# Patient Record
Sex: MALE | Race: BLACK OR AFRICAN AMERICAN | Employment: UNEMPLOYED | URBAN - NONMETROPOLITAN AREA
[De-identification: names, ages, dates, MRNs, and addresses within clinical notes are randomized per-mention and may not be internally consistent; named-entity substitution may affect disease eponyms.]

---

## 2017-01-04 ENCOUNTER — OFFICE VISIT (OUTPATIENT)
Dept: FAMILY MEDICINE CLINIC | Age: 47
End: 2017-01-04

## 2017-01-04 VITALS
DIASTOLIC BLOOD PRESSURE: 82 MMHG | HEIGHT: 68 IN | WEIGHT: 169 LBS | TEMPERATURE: 60.8 F | RESPIRATION RATE: 16 BRPM | BODY MASS INDEX: 25.61 KG/M2 | SYSTOLIC BLOOD PRESSURE: 154 MMHG | HEART RATE: 80 BPM

## 2017-01-04 DIAGNOSIS — I10 ESSENTIAL HYPERTENSION: Primary | ICD-10-CM

## 2017-01-04 DIAGNOSIS — Z13.9 SCREENING: ICD-10-CM

## 2017-01-04 DIAGNOSIS — Z23 NEEDS FLU SHOT: ICD-10-CM

## 2017-01-04 DIAGNOSIS — Z72.0 TOBACCO ABUSE: ICD-10-CM

## 2017-01-04 PROCEDURE — 99213 OFFICE O/P EST LOW 20 MIN: CPT | Performed by: NURSE PRACTITIONER

## 2017-01-04 PROCEDURE — 90471 IMMUNIZATION ADMIN: CPT | Performed by: NURSE PRACTITIONER

## 2017-01-04 PROCEDURE — 99406 BEHAV CHNG SMOKING 3-10 MIN: CPT | Performed by: NURSE PRACTITIONER

## 2017-01-04 PROCEDURE — 90688 IIV4 VACCINE SPLT 0.5 ML IM: CPT | Performed by: NURSE PRACTITIONER

## 2017-01-04 RX ORDER — AMLODIPINE BESYLATE 5 MG/1
TABLET ORAL
Qty: 30 TABLET | Refills: 11 | Status: SHIPPED | OUTPATIENT
Start: 2017-01-04 | End: 2021-04-12

## 2017-01-04 RX ORDER — ASPIRIN 81 MG/1
81 TABLET ORAL DAILY
Qty: 30 TABLET | Refills: 3 | Status: SHIPPED | OUTPATIENT
Start: 2017-01-04 | End: 2017-06-14 | Stop reason: SDUPTHER

## 2017-01-04 RX ORDER — VARENICLINE TARTRATE 25 MG
KIT ORAL
Qty: 1 EACH | Refills: 0 | Status: SHIPPED | OUTPATIENT
Start: 2017-01-04 | End: 2017-01-19 | Stop reason: SDUPTHER

## 2017-01-04 RX ORDER — VARENICLINE TARTRATE 1 MG/1
1 TABLET, FILM COATED ORAL 2 TIMES DAILY
Qty: 90 TABLET | Refills: 1 | Status: SHIPPED | OUTPATIENT
Start: 2017-01-04 | End: 2017-01-19 | Stop reason: SDUPTHER

## 2017-01-04 ASSESSMENT — PATIENT HEALTH QUESTIONNAIRE - PHQ9
1. LITTLE INTEREST OR PLEASURE IN DOING THINGS: 0
SUM OF ALL RESPONSES TO PHQ9 QUESTIONS 1 & 2: 0
SUM OF ALL RESPONSES TO PHQ QUESTIONS 1-9: 0
2. FEELING DOWN, DEPRESSED OR HOPELESS: 0

## 2017-01-04 ASSESSMENT — ENCOUNTER SYMPTOMS
BLURRED VISION: 0
CHEST TIGHTNESS: 0
RHINORRHEA: 0
SHORTNESS OF BREATH: 0
CHOKING: 0
COUGH: 0
GASTROINTESTINAL NEGATIVE: 1

## 2017-01-19 DIAGNOSIS — Z72.0 TOBACCO ABUSE: ICD-10-CM

## 2017-01-19 RX ORDER — VARENICLINE TARTRATE 1 MG/1
1 TABLET, FILM COATED ORAL 2 TIMES DAILY
Qty: 90 TABLET | Refills: 2
Start: 2017-01-19 | End: 2021-03-25 | Stop reason: ALTCHOICE

## 2017-01-19 RX ORDER — VARENICLINE TARTRATE 25 MG
KIT ORAL
Qty: 1 EACH | Refills: 0
Start: 2017-01-19 | End: 2021-03-25 | Stop reason: ALTCHOICE

## 2017-03-30 ENCOUNTER — TELEPHONE (OUTPATIENT)
Dept: FAMILY MEDICINE CLINIC | Age: 47
End: 2017-03-30

## 2017-04-28 ENCOUNTER — TELEPHONE (OUTPATIENT)
Dept: FAMILY MEDICINE CLINIC | Age: 47
End: 2017-04-28

## 2017-06-14 DIAGNOSIS — I10 ESSENTIAL HYPERTENSION: ICD-10-CM

## 2017-06-14 RX ORDER — ASPIRIN 81 MG/1
TABLET ORAL
Qty: 30 TABLET | Refills: 3 | Status: SHIPPED | OUTPATIENT
Start: 2017-06-14 | End: 2017-12-21 | Stop reason: SDUPTHER

## 2017-12-21 DIAGNOSIS — I10 ESSENTIAL HYPERTENSION: ICD-10-CM

## 2017-12-21 RX ORDER — ACETAMINOPHEN/DIPHENHYDRAMINE 500MG-25MG
TABLET ORAL
Qty: 30 TABLET | Refills: 3 | Status: SHIPPED | OUTPATIENT
Start: 2017-12-21 | End: 2021-04-12

## 2021-03-11 ENCOUNTER — HOSPITAL ENCOUNTER (EMERGENCY)
Age: 51
Discharge: HOME | End: 2021-03-11
Attending: EMERGENCY MEDICINE
Payer: COMMERCIAL

## 2021-03-11 VITALS
SYSTOLIC BLOOD PRESSURE: 147 MMHG | OXYGEN SATURATION: 98 % | TEMPERATURE: 98.3 F | DIASTOLIC BLOOD PRESSURE: 95 MMHG | HEART RATE: 87 BPM | BODY MASS INDEX: 24.69 KG/M2 | WEIGHT: 160 LBS | RESPIRATION RATE: 18 BRPM

## 2021-03-11 DIAGNOSIS — S61.452A DOG BITE OF LEFT HAND, INITIAL ENCOUNTER: Primary | ICD-10-CM
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
## 2021-03-11 PROCEDURE — 6360000002 HC RX W HCPCS

## 2021-03-11 PROCEDURE — 12001 RPR S/N/AX/GEN/TRNK 2.5CM/<: CPT

## 2021-03-11 PROCEDURE — 90471 IMMUNIZATION ADMIN: CPT

## 2021-03-11 PROCEDURE — 90715 TDAP VACCINE 7 YRS/> IM: CPT

## 2021-03-11 PROCEDURE — 99283 EMERGENCY DEPT VISIT LOW MDM: CPT

## 2021-03-11 RX ORDER — LIDOCAINE HYDROCHLORIDE 20 MG/ML
INJECTION, SOLUTION INFILTRATION; PERINEURAL
Status: DISCONTINUED
Start: 2021-03-11 | End: 2021-03-11 | Stop reason: WASHOUT

## 2021-03-11 RX ORDER — LIDOCAINE HCL/PF 100 MG/5ML
5 SYRINGE (ML) INJECTION ONCE
Status: DISCONTINUED | OUTPATIENT
Start: 2021-03-11 | End: 2021-03-12 | Stop reason: HOSPADM

## 2021-03-11 RX ORDER — AMOXICILLIN AND CLAVULANATE POTASSIUM 875; 125 MG/1; MG/1
1 TABLET, FILM COATED ORAL 2 TIMES DAILY
Qty: 20 TABLET | Refills: 0 | Status: SHIPPED | OUTPATIENT
Start: 2021-03-11 | End: 2021-03-21

## 2021-03-11 RX ORDER — LIDOCAINE HYDROCHLORIDE 20 MG/ML
INJECTION, SOLUTION INFILTRATION; PERINEURAL
Status: DISCONTINUED
Start: 2021-03-11 | End: 2021-03-12 | Stop reason: HOSPADM

## 2021-03-11 RX ADMIN — TETANUS TOXOID, REDUCED DIPHTHERIA TOXOID AND ACELLULAR PERTUSSIS VACCINE, ADSORBED 0.5 ML: 5; 2.5; 8; 8; 2.5 SUSPENSION INTRAMUSCULAR at 22:16

## 2021-03-11 ASSESSMENT — ENCOUNTER SYMPTOMS
SHORTNESS OF BREATH: 0
COUGH: 0
BACK PAIN: 0
VOMITING: 0
NAUSEA: 0

## 2021-03-11 ASSESSMENT — PAIN DESCRIPTION - FREQUENCY: FREQUENCY: CONTINUOUS

## 2021-03-11 ASSESSMENT — PAIN DESCRIPTION - ORIENTATION: ORIENTATION: LEFT

## 2021-03-12 NOTE — ED NOTES
Bed: 034A  Expected date: 3/11/21  Expected time: 7:48 PM  Means of arrival: [facility]  Comments:  Dog bite     Rosalva Lopez, [address] [facility]  03/11/21 1956

## 2021-03-12 NOTE — ED PROVIDER NOTES
current facility-administered medications for this encounter. Current Outpatient Medications:     amoxicillin-clavulanate (AUGMENTIN) 875-125 MG per tablet, Take 1 tablet by mouth 2 times daily for 10 days, Disp: 20 tablet, Rfl: 0    RA ASPIRIN EC 81 MG EC tablet, take 1 tablet by mouth once daily, Disp: 30 tablet, Rfl: 3    varenicline (CHANTIX) 1 MG tablet, Take 1 tablet by mouth 2 times daily, Disp: 90 tablet, Rfl: 2    varenicline (CHANTIX STARTING MONTH PAK) 0.5 MG X 11 & 1 MG X 42 tablet, Take by mouth., Disp: 1 each, Rfl: 0    amLODIPine (NORVASC) 5 MG tablet, take 1 tablet by mouth once daily, Disp: 30 tablet, Rfl: 11      SOCIAL HISTORY     Social History     Social History Narrative    Not on file     Social History     Tobacco Use    Smoking status: Current Every Day Smoker     Packs/day: 0.50     Types: Cigarettes    Smokeless tobacco: Never Used   Substance Use Topics    Alcohol use: No     Alcohol/week: 0.0 standard drinks    Drug use: No         ALLERGIES   No Known Allergies      FAMILY HISTORY     Family History   Problem Relation Age of Onset    Heart Disease Mother     Cancer Mother     Heart Disease Maternal Grandmother     Cancer Maternal Grandmother          PREVIOUS RECORDS   Previous records reviewed: non-contributory to current problem. .        PHYSICAL EXAM     ED Triage Vitals [03/11/21 1957]   BP Temp Temp Source Pulse Resp SpO2 Height Weight   (!) 147/95 98.3 °F (36.8 °C) Oral 87 18 98 % -- 160 lb (72.6 kg)     Initial vital signs and nursing assessment reviewed and normal. Body mass index is 24.69 kg/m². Pulsoximetry is normal per my interpretation. Additional Vital Signs:  Vitals:    03/11/21 1957   BP: (!) 147/95   Pulse: 87   Resp: 18   Temp: 98.3 °F (36.8 °C)   SpO2: 98%       Physical Exam  Constitutional:       Appearance: Normal appearance. He is normal weight. Cardiovascular:      Rate and Rhythm: Normal rate and regular rhythm. Pulses: Normal pulses. injection 0.5 mL (0.5 mLs Intramuscular Given 3/11/21 0939)         ED COURSE      Strict return precautions and follow up instructions were discussed with the patient prior to discharge, with which the patient agrees. MEDICATION CHANGES     Discharge Medication List as of 3/11/2021 10:10 PM      START taking these medications    Details   amoxicillin-clavulanate (AUGMENTIN) 875-125 MG per tablet Take 1 tablet by mouth 2 times daily for 10 days, Disp-20 tablet, R-0Normal               FINAL DISPOSITION     Final diagnoses:   Dog bite of left hand, initial encounter     Condition: condition: good  Dispo: Discharge to home      This transcription was electronically signed. Parts of this transcriptions may have been dictated by use of voice recognition software and electronically transcribed, and parts may have been transcribed with the assistance of an ED scribe. The transcription may contain errors not detected in proofreading. Please refer to my supervising physician's documentation if my documentation differs.     Electronically Signed: José Marmolejo, 03/15/21, 1:02 PM         Clifford Erazo DPM  Resident  03/11/21 [address] [place] [address]  03/15/21 8314

## 2021-03-12 NOTE — ED TRIAGE NOTES
Pt presents to ED via [facility] for c/o dog bite to the L hand. Pt states he was putting his sister's dog in the kennel when it lunged at him and caught his L palm. Pt presents with H hand bandaged. EMS reports minimal bleeding upon their arrival. Pt states he believes shots are UTD with dog. Pt admits to 2 beers today. Significant other at bedside. Will monitor.

## 2021-03-23 ENCOUNTER — TELEPHONE (OUTPATIENT)
Dept: FAMILY MEDICINE CLINIC | Age: 51
End: 2021-03-23

## 2021-03-23 NOTE — TELEPHONE ENCOUNTER
This pt has not been seen in 4 years so will be a new pt. Please sh[place] that on his appt slot.  thanks

## 2021-03-25 ENCOUNTER — OFFICE VISIT (OUTPATIENT)
Dept: FAMILY MEDICINE CLINIC | Age: 51
End: 2021-03-25
Payer: COMMERCIAL

## 2021-03-25 ENCOUNTER — TELEPHONE (OUTPATIENT)
Dept: FAMILY MEDICINE CLINIC | Age: 51
End: 2021-03-25

## 2021-03-25 VITALS
TEMPERATURE: 98.2 F | OXYGEN SATURATION: 99 % | WEIGHT: 157 LBS | DIASTOLIC BLOOD PRESSURE: 82 MMHG | RESPIRATION RATE: 14 BRPM | HEART RATE: 80 BPM | BODY MASS INDEX: 24.64 KG/M2 | SYSTOLIC BLOOD PRESSURE: 124 MMHG | HEIGHT: 67 IN

## 2021-03-25 DIAGNOSIS — Z48.02 VISIT FOR SUTURE REMOVAL: ICD-10-CM

## 2021-03-25 DIAGNOSIS — S65.312D LACERATION OF DEEP PALMAR ARCH OF LEFT HAND, SUBSEQUENT ENCOUNTER: ICD-10-CM

## 2021-03-25 DIAGNOSIS — Z87.891 PERSONAL HISTORY OF TOBACCO USE, PRESENTING HAZARDS TO HEALTH: Primary | ICD-10-CM

## 2021-03-25 DIAGNOSIS — Z00.00 ENCOUNTER FOR MEDICAL EXAMINATION TO ESTABLISH CARE: ICD-10-CM

## 2021-03-25 PROCEDURE — 99407 BEHAV CHNG SMOKING > 10 MIN: CPT | Performed by: NURSE PRACTITIONER

## 2021-03-25 PROCEDURE — 99204 OFFICE O/P NEW MOD 45 MIN: CPT | Performed by: NURSE PRACTITIONER

## 2021-03-25 ASSESSMENT — PATIENT HEALTH QUESTIONNAIRE - PHQ9
SUM OF ALL RESPONSES TO PHQ QUESTIONS 1-9: 0
SUM OF ALL RESPONSES TO PHQ QUESTIONS 1-9: 0
1. LITTLE INTEREST OR PLEASURE IN DOING THINGS: 0
SUM OF ALL RESPONSES TO PHQ9 QUESTIONS 1 & 2: 0
2. FEELING DOWN, DEPRESSED OR HOPELESS: 0

## 2021-03-25 ASSESSMENT — ENCOUNTER SYMPTOMS
COLOR CHANGE: 1
RESPIRATORY NEGATIVE: 1

## 2021-03-25 NOTE — TELEPHONE ENCOUNTER
Pt scheduled for appt today 3/25/21 with  Angie Angel at 11:00 . Provider requested that we call pt to see if this time still works for pt. Pt verified he will be coming to appt today.
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
## 2021-04-12 ENCOUNTER — OFFICE VISIT (OUTPATIENT)
Dept: FAMILY MEDICINE CLINIC | Age: 51
End: 2021-04-12
Payer: COMMERCIAL

## 2021-04-12 VITALS
RESPIRATION RATE: 10 BRPM | HEIGHT: 67 IN | OXYGEN SATURATION: 100 % | TEMPERATURE: 97.9 F | SYSTOLIC BLOOD PRESSURE: 124 MMHG | DIASTOLIC BLOOD PRESSURE: 76 MMHG | BODY MASS INDEX: 25.43 KG/M2 | WEIGHT: 162 LBS | HEART RATE: 74 BPM

## 2021-04-12 DIAGNOSIS — Z11.59 ENCOUNTER FOR HEPATITIS C SCREENING TEST FOR LOW RISK PATIENT: ICD-10-CM

## 2021-04-12 DIAGNOSIS — Z11.4 SCREENING FOR HIV (HUMAN IMMUNODEFICIENCY VIRUS): ICD-10-CM

## 2021-04-12 DIAGNOSIS — Z13.220 SCREENING CHOLESTEROL LEVEL: ICD-10-CM

## 2021-04-12 DIAGNOSIS — L91.0 KELOID: ICD-10-CM

## 2021-04-12 DIAGNOSIS — Z00.00 PHYSICAL EXAM: Primary | ICD-10-CM

## 2021-04-12 DIAGNOSIS — Z12.5 SCREENING FOR PROSTATE CANCER: ICD-10-CM

## 2021-04-12 PROCEDURE — 99396 PREV VISIT EST AGE 40-64: CPT | Performed by: NURSE PRACTITIONER

## 2021-04-12 NOTE — PATIENT INSTRUCTIONS
to contact your doctor if:    · You do not get better as expected. Where can you learn more? Go to [email or web address] and sign in to your [employer] account. Enter E474 in the [facility] box to learn more about \"Keloids: Care Instructions. \"     If you do not have an account, please click on the \"Sign Up Now\" link. Current as of: July 2, 2020               Content Version: 12.8  © 2006-2021 Healthwise, [employer]. Care instructions adapted under license by [facility] ([facility]). If you have questions about a medical condition or this instruction, always ask your healthcare professional. [place] [address] any warranty or liability for your use of this information. Patient Education        Well Visit, Ages 25 to 48: Care Instructions  Overview     Well visits can help you stay healthy. Your doctor has checked your overall health and may have suggested ways to take good care of yourself. Your doctor also may have recommended tests. At home, you can help prevent illness with healthy eating, regular exercise, and other steps. Follow-up care is a key part of your treatment and safety. Be sure to make and go to all appointments, and call your doctor if you are having problems. It's also a good idea to know your test results and keep a list of the medicines you take. How can you care for yourself at home? · Get screening tests that you and your doctor decide on. Screening helps find diseases before any symptoms appear. · Eat healthy foods. Choose fruits, vegetables, whole grains, protein, and low-fat dairy foods. Limit fat, especially saturated fat. Reduce salt in your diet. · Limit alcohol. If you are a man, have no more than 2 drinks a day or 14 drinks a week. If you are a woman, have no more than 1 drink a day or 7 drinks a week. · Get at least 30 minutes of physical activity on most days of the week. Walking is a good choice.  You also may want to do other activities, such as running, swimming, cycling, or playing tennis or team sports. Discuss any changes in your exercise program with your doctor. · Reach and stay at a healthy weight. This will lower your risk for many problems, such as obesity, diabetes, heart disease, and high blood pressure. · Do not smoke or allow others to smoke around you. If you need help quitting, talk to your doctor about stop-smoking programs and medicines. These can increase your chances of quitting for good. · Care for your mental health. It is easy to get weighed down by worry and stress. Learn strategies to manage stress, like deep breathing and mindfulness, and stay connected with your family and community. If you find you often feel sad or hopeless, talk with your doctor. Treatment can help. · Talk to your doctor about whether you have any risk factors for sexually transmitted infections (STIs). You can help prevent STIs if you wait to have sex with a new partner (or partners) until you've each been tested for STIs. It also helps if you use condoms (male or female condoms) and if you limit your sex partners to one person who only has sex with you. Vaccines are available for some STIs, such as HPV. · Use birth control if it's important to you to prevent pregnancy. Talk with your doctor about the choices available and what might be best for you. · If you think you may have a problem with alcohol or drug use, talk to your doctor. This includes prescription medicines (such as amphetamines and opioids) and illegal drugs (such as cocaine and methamphetamine). Your doctor can help you figure out what type of treatment is best for you. · Protect your skin from too much sun. When you're outdoors from 10 a.m. to 4 p.m., stay in the shade or cover up with clothing and a hat with a wide brim. Wear sunglasses that block UV rays. Even when it's cloudy, put broad-spectrum sunscreen (SPF 30 or higher) on any exposed skin.   · See a dentist one or two times a year for checkups and to have your teeth cleaned. · Wear a seat belt in the car. When should you call for help? Watch closely for changes in your health, and be sure to contact your doctor if you have any problems or symptoms that concern you. Where can you learn more? Go to [email or web address] and sign in to your [employer] account. Enter P072 in the [employer] box to learn more about \"Well Visit, Ages 25 to 48: Care Instructions. \"     If you do not have an account, please click on the \"Sign Up Now\" link. Current as of: May 27, 2020               Content Version: 12.8  © 2006-2021 Healthwise, Incorporated. Care instructions adapted under license by [facility] ([facility]). If you have questions about a medical condition or this instruction, always ask your healthcare professional. [address] any warranty or liability for your use of this information.
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
## 2021-05-07 LAB
ABSOLUTE BASO #: 0 X10E9/L (ref 0–0.2)
ABSOLUTE EOS #: 0.1 X10E9/L (ref 0–0.4)
ABSOLUTE LYMPH #: 2.4 X10E9/L (ref 1–3.5)
ABSOLUTE MONO #: 0.6 X10E9/L (ref 0–0.9)
ABSOLUTE NEUT #: 1.8 X10E9/L (ref 1.5–6.6)
ALBUMIN SERPL-MCNC: 4.6 G/DL (ref 3.2–5.3)
ALK PHOSPHATASE: 46 U/L (ref 39–130)
ALT SERPL-CCNC: 32 U/L (ref 0–40)
ANION GAP SERPL CALCULATED.3IONS-SCNC: 7 MMOL/L (ref 5–15)
AST SERPL-CCNC: 38 U/L (ref 0–41)
BASOPHILS RELATIVE PERCENT: 0.7 %
BILIRUB SERPL-MCNC: 0.6 MG/DL (ref 0.3–1.2)
BUN BLDV-MCNC: 14 MG/DL (ref 5–23)
CALCIUM SERPL-MCNC: 9.6 MG/DL (ref 8.5–10.5)
CHLORIDE BLD-SCNC: 105 MMOL/L (ref 98–109)
CHOLESTEROL/HDL RATIO: 2.5 (ref 1–5)
CHOLESTEROL: 243 MG/DL (ref 150–200)
CO2: 30 MMOL/L (ref 22–32)
CREAT SERPL-MCNC: 1.1 MG/DL (ref 0.6–1.3)
EGFR AFRICAN AMERICAN: >60 ML/MIN/1.73SQ.M
EGFR IF NONAFRICAN AMERICAN: >60 ML/MIN/1.73SQ.M
EOSINOPHILS RELATIVE PERCENT: 1.2 %
GLUCOSE: 86 MG/DL (ref 65–99)
HCT VFR BLD CALC: 39.6 % (ref 39–49)
HDLC SERPL-MCNC: 96 MG/DL
HEMOGLOBIN: 13.3 G/DL (ref 13–17)
HEPATITIS C ANTIBODY: NORMAL
HIV 1,2 COMBO ANTIGEN/ANTIBODY: NORMAL
LDL CHOLESTEROL CALCULATED: 138 MG/DL
LDL/HDL RATIO: 1.4
LYMPHOCYTE %: 50 %
MCH RBC QN AUTO: 32.1 PG (ref 27–34)
MCHC RBC AUTO-ENTMCNC: 33.6 G/DL (ref 32–36)
MCV RBC AUTO: 96 FL (ref 80–100)
MONOCYTES # BLD: 12 %
NEUTROPHILS RELATIVE PERCENT: 36.1 %
PDW BLD-RTO: 15.9 % (ref 11.5–15)
PLATELETS: 242 X10E9/L (ref 150–450)
PMV BLD AUTO: 7.2 FL (ref 7–12)
POTASSIUM SERPL-SCNC: 4.1 MMOL/L (ref 3.5–5)
PSA, ULTRASENSITIVE: 3.03 NG/ML (ref 0–4)
RBC: 4.14 X10E12/L (ref 4.1–5.7)
SODIUM BLD-SCNC: 142 MMOL/L (ref 134–146)
TOTAL PROTEIN: 7.1 G/DL (ref 6–8)
TRIGL SERPL-MCNC: 46 MG/DL (ref 27–150)
TSH SERPL DL<=0.05 MIU/L-ACNC: 1.86 UIU/ML (ref 0.49–4.67)
VLDLC SERPL CALC-MCNC: 9 MG/DL (ref 0–30)
WBC: 4.9 X10E9/L (ref 4–11)

## 2021-05-10 ENCOUNTER — TELEPHONE (OUTPATIENT)
Dept: FAMILY MEDICINE CLINIC | Age: 51
End: 2021-05-10

## 2021-05-10 DIAGNOSIS — E78.00 ELEVATED CHOLESTEROL: Primary | ICD-10-CM

## 2021-05-10 NOTE — TELEPHONE ENCOUNTER
----- Message from BUSHRA Lou - CNP sent at 5/10/2021  7:49 AM EDT -----  Let pt know his kidney and liver function is normal, electrolytes are normal,  Wbc and hgb in normal range. Cholesterol a little high at 243 would recommend work on a high fiber low cholesterol diet and we can recheck labs in 3-4 months.  High fiber, cheerios flax seed fish oil etc.

## 2021-10-12 ENCOUNTER — OFFICE VISIT (OUTPATIENT)
Dept: FAMILY MEDICINE CLINIC | Age: 51
End: 2021-10-12
Payer: COMMERCIAL

## 2021-10-12 ENCOUNTER — TELEPHONE (OUTPATIENT)
Dept: FAMILY MEDICINE CLINIC | Age: 51
End: 2021-10-12

## 2021-10-12 VITALS
DIASTOLIC BLOOD PRESSURE: 88 MMHG | HEIGHT: 68 IN | HEART RATE: 91 BPM | RESPIRATION RATE: 14 BRPM | WEIGHT: 160.4 LBS | SYSTOLIC BLOOD PRESSURE: 136 MMHG | BODY MASS INDEX: 24.31 KG/M2 | TEMPERATURE: 97.9 F | OXYGEN SATURATION: 97 %

## 2021-10-12 DIAGNOSIS — M79.609 PARESTHESIA AND PAIN OF RIGHT EXTREMITY: ICD-10-CM

## 2021-10-12 DIAGNOSIS — M54.6 CHRONIC BILATERAL THORACIC BACK PAIN: ICD-10-CM

## 2021-10-12 DIAGNOSIS — M54.2 CERVICALGIA: Primary | ICD-10-CM

## 2021-10-12 DIAGNOSIS — M54.12 CERVICAL RADICULOPATHY: ICD-10-CM

## 2021-10-12 DIAGNOSIS — Z72.0 TOBACCO ABUSE: ICD-10-CM

## 2021-10-12 DIAGNOSIS — Z12.11 SCREEN FOR COLON CANCER: ICD-10-CM

## 2021-10-12 DIAGNOSIS — Z86.73 HISTORY OF CVA IN ADULTHOOD: ICD-10-CM

## 2021-10-12 DIAGNOSIS — R20.2 PARESTHESIA AND PAIN OF RIGHT EXTREMITY: ICD-10-CM

## 2021-10-12 DIAGNOSIS — G89.29 CHRONIC BILATERAL THORACIC BACK PAIN: ICD-10-CM

## 2021-10-12 PROCEDURE — 96372 THER/PROPH/DIAG INJ SC/IM: CPT | Performed by: NURSE PRACTITIONER

## 2021-10-12 PROCEDURE — 99214 OFFICE O/P EST MOD 30 MIN: CPT | Performed by: NURSE PRACTITIONER

## 2021-10-12 RX ORDER — METHYLPREDNISOLONE ACETATE 40 MG/ML
80 INJECTION, SUSPENSION INTRA-ARTICULAR; INTRALESIONAL; INTRAMUSCULAR; SOFT TISSUE ONCE
Status: COMPLETED | OUTPATIENT
Start: 2021-10-12 | End: 2021-10-12

## 2021-10-12 RX ORDER — ASPIRIN 81 MG/1
81 TABLET ORAL DAILY
Qty: 90 TABLET | Refills: 3 | Status: SHIPPED | OUTPATIENT
Start: 2021-10-12 | End: 2022-06-30

## 2021-10-12 RX ORDER — KETOROLAC TROMETHAMINE 30 MG/ML
60 INJECTION, SOLUTION INTRAMUSCULAR; INTRAVENOUS ONCE
Status: COMPLETED | OUTPATIENT
Start: 2021-10-12 | End: 2021-10-12

## 2021-10-12 RX ORDER — AMLODIPINE BESYLATE 2.5 MG/1
2.5 TABLET ORAL DAILY
Qty: 30 TABLET | Refills: 3 | Status: SHIPPED | OUTPATIENT
Start: 2021-10-12 | End: 2022-01-20 | Stop reason: SDUPTHER

## 2021-10-12 RX ORDER — CYCLOBENZAPRINE HCL 5 MG
5 TABLET ORAL 2 TIMES DAILY PRN
Qty: 10 TABLET | Refills: 0 | Status: SHIPPED | OUTPATIENT
Start: 2021-10-12 | End: 2021-10-22

## 2021-10-12 RX ADMIN — KETOROLAC TROMETHAMINE 60 MG: 30 INJECTION, SOLUTION INTRAMUSCULAR; INTRAVENOUS at 11:26

## 2021-10-12 RX ADMIN — METHYLPREDNISOLONE ACETATE 80 MG: 40 INJECTION, SUSPENSION INTRA-ARTICULAR; INTRALESIONAL; INTRAMUSCULAR; SOFT TISSUE at 11:27

## 2021-10-12 NOTE — TELEPHONE ENCOUNTER
I told him to have paperwork sent to us if SSI needs something, you can contact [employer] to see what needs done thanks
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
## 2021-10-12 NOTE — TELEPHONE ENCOUNTER
At check out, patient stated that he spoke with the provider about contacting SSI.     He provided the following information:    Johnathan douglas/SSI  [phone]

## 2021-10-12 NOTE — TELEPHONE ENCOUNTER
Attempted to call the # provided , received msg if you already spoke with us press 1, if not press 2. There is not a provider line.   Pt will need to find out what is required and let us know    Left detailed msg on pt's VM

## 2021-10-27 ENCOUNTER — PROCEDURE VISIT (OUTPATIENT)
Dept: NEUROLOGY | Age: 51
End: 2021-10-27

## 2021-10-27 DIAGNOSIS — M79.601 RIGHT ARM PAIN: ICD-10-CM

## 2021-10-27 DIAGNOSIS — G56.01 RIGHT CARPAL TUNNEL SYNDROME: Primary | ICD-10-CM

## 2021-10-27 DIAGNOSIS — M54.12 CERVICAL RADICULOPATHY: ICD-10-CM

## 2021-10-27 PROCEDURE — 95886 MUSC TEST DONE W/N TEST COMP: CPT | Performed by: PSYCHIATRY & NEUROLOGY

## 2021-10-27 PROCEDURE — 95909 NRV CNDJ TST 5-6 STUDIES: CPT | Performed by: PSYCHIATRY & NEUROLOGY

## 2021-11-10 ENCOUNTER — HOSPITAL ENCOUNTER (OUTPATIENT)
Dept: PHYSICAL THERAPY | Age: 51
Setting detail: THERAPIES SERIES
Discharge: HOME | End: 2021-11-10
Payer: COMMERCIAL

## 2021-11-10 PROCEDURE — 97161 PT EVAL LOW COMPLEX 20 MIN: CPT

## 2021-11-10 PROCEDURE — 97140 MANUAL THERAPY 1/> REGIONS: CPT

## 2021-11-10 NOTE — PROGRESS NOTES
** PLEASE SIGN, DATE AND TIME CERTIFICATION BELOW AND RETURN TO [facility] OUTPATIENT REHABILITATION (FAX #: [phone]). ATTEST/CO-SIGN IF ACCESSING VIA [employer]. THANK YOU.**    I certify that I have examined the patient below and determined that Physical Medicine and Rehabilitation service is necessary and that I approve the established plan of care for up to 90 days or as specifically noted. Attestation, signature or co-signature of physician indicates approval of certification requirements.    ________________________ ____________ __________  Physician Signature   Date   Time  [address]  PHYSICAL THERAPY  [x] EVALUATION  [] DAILY NOTE (LAND) [] DAILY NOTE (AQUATIC ) [] PROGRESS NOTE [] DISCHARGE NOTE    [x] [address] [facility]   [] [place] [address]    [] [address] [facility]   [] Jonah Blackstone    Date: 11/10/2021  Patient Name:  Angelique Potter  : 1970  MRN: [id number]  CSN: [id number]    Referring Practitioner BUSHRA Willingham - *   Diagnosis Cervicalgia [M54.2]  Pain in thoracic spine [M54.6]  Radiculopathy, cervical region [M54.12]  Pain in unspecified limb [M79.609]  Paresthesia of skin [R20.2]    Treatment Diagnosis Neck pain, RUE pain, decreased ROM, decreased strength. Date of Evaluation 11/10/21    Additional Pertinent History HTN, stroke (),       Functional Outcome Measure Used NDI   Functional Outcome Score 18/50 (32%) (11/10/21)       Insurance: Primary: Payor: Lucas Meyers /  /  / ,   Secondary:    Authorization Information:    Visit # 1, 1/10 for progress note   Visits Allowed: Dorothea Harris after 30   Recertification Date:    Physician Follow-Up: 21   Physician Orders:    History of Present Illness: Pt notes that about 10-12 years ago he went on a trip to [place], noting that on his trip he noticed decreased cervical mobility, though he might of just slept on it wrong.  Pt notes since initial onset his pain, mobility have progressed. Pt notes having RUE radic symptoms at time throughout the posterior aspect of the RUE. Pt notes that his symptoms have made it tough for him to complete work duties, has applied for disability. Has been off of work for about two months. Pt notes having EMG study completed on 10/27/21, goes to  To review results next week. Pt notes increased cervical pain with prolonged sitting, lying supine, + sleep disturbances. 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 Pt notes that in 2013 he had a stroke, was hospitalized for about two weeks, notes that getting light headed at time, other than that no lasting deficits. Pt denies having any previous car accidents or injuries. SUBJECTIVE: Pt denies any recent imaging other than EMG study. Social/Functional History and Current Status:  Medications and Allergies have been reviewed and are listed on Medical History Questionnaire. Nicky Ortiz lives alone in [place] with stairs and a handrail to enter. Task Previous Current   ADLs  Independent Modified Independent Difficulty with RUE ROM. IADL's Independent Modified Independent Has been having friends assist    Ambulation Independent Independent (Sharp pain down spine at times)   Transfers Independent Modified Independent Completed with increased time    Recreation [employer] Independent        Driving Active  Active    Work [employer]. Occupation: [employer]  Off of work since 9/28/21, attendance issues secondary to neck/back symptoms.  Currently applying for disability        Objective:    GENERAL   Pain 4/10 midline C2-T8   Palpation    Sensation    Observation    Edema    Accessory Motion          POSTURE     Comments   Forward Head     Rounded Shoulders     Kyphosis     Lordosis     Lateral Shift     Scoliosis         NECK RANGE OF MOTION   Flexion 33   Extension 36   Rotation Right 34 pain midline C6-T2   Rotation Left 30 Sidebending Right 10 Left mid thoracic pulling/pain    Sidebending Left 16 pain midline C6-T2   Retraction    Protraction    Neck Range of Motion is [facility]  []     UPPER EXTREMITY RANGE OF MOTION    Left Right Comments   Shoulder Flexion 108 Mid thoracic pain (heat) 120 Mid thoracic pain     Shoulder Extension      Shoulder Abduction      Shoulder Adduction      Shoulder External Rotation C7 T2 lateral shoulder pain     Shoulder Internal Rotation T4 T12 (right anterior/lateral shoulder pain     Shoulder Range of Motion is [facility]  []      Elbow Flexion      Elbow Extension      Elbow Range of Motion is [facility]  []     UPPER EXTREMITY STRENGTH    Left Right Comments   Shoulder Flexion 4+/5 4-/5    Shoulder Extension      Shoulder Abduction 5-/5 4-/5 posterior shoulder pain     Shoulder Adduction      Shoulder External Rotation 5/5 5/5    Shoulder Internal Rotation 5/5 4+/5    []  Shoulder Strength is grossly WFL. Elbow Flexion 5/5 5-/5    Elbow Extension 5/5 5/5    Forearm Pronation      Forearm Supination      [] Elbow Strength is grossly WFL.  Strength          SPECIAL TESTS (+/-)    Left Right Comments   Cerv. Compression      Cerv. Distraction      Cerv.  Alar/Transverse      Vertebral Artery - -    Spurling's - -          Nkechi          TREATMENT   Precautions:    Pain: 4/10 midline C2-T8    X in shaded column indicates activity completed today   Modalities Parameters/  Location  Notes   Modalities prn for pain       Estim/US combo             Manual Therapy Time/Technique  Notes   Cervical Rotation/SB PROM   x Limited by gurading   Cervical gentle Side glides / Upglides   x    STM Cervical/Thoracic paraspinals       Exercise/Intervention   Notes   Chin Tuck  8  x    Supine Cervical Rotation        UT/LS/Scalene  Stretch        Supine Pec Major/Minor Stretch        Yajaira pose       S/l thoracic rotation        Cat Camel                             UBE Retro          Interventions Next Treatment: Cervical/Thoracic mobility, restoring UE ROM, postural/UE strengthening per irritability levels. Activity/Treatment Tolerance:  []  Patient tolerated treatment well  []  Patient limited by fatigue  [x]  Patient limited by pain   []  Patient limited by medical complications  []  Other:     Assessment: Pt presents with progressive chronic cervical pain, decreased cervical/UE ROM, and decreased UE strength. Pt currently reports limitations with IADLs, work duties, IADLs, and ADLs. Pt to benefit from skilled PT to address the above deficits. Pt demo fair treatment tolerance this date secondary to pain and guarding. Pt notes feeling \"a little looser\" at the completion of the PT session. Body Structures/Functions/Activity Limitations: impaired activity tolerance, impaired ROM, impaired strength and pain  Prognosis: fair    GOALS:  Patient Goal: Decreasing pain and improving mobility     Short Term Goals: 4 Weeks    Pt will report decreased pain levels from 4/10 to 2/10 for improved comfort and activity tolerance. Long Term Goals: 8 Weeks   Pt will demo cervical spine AROM WFL pain free to aid in ease with Work duties . Pt will improve NDI score from 18/50 to <=8/50 to indicate decreased functional limitations. Pt to improve BUE strength to >= 5-/5 for ease with work duties . Pt to demo good body mechanics within therapy visits. Pt to be compliant and independent with HEP. Patient Education:   [x]  HEP/Education Completed: Plan of Care, Goals, body mechanics, activity modifications, posture.  [email or web address] Access Code: Issue next visit   []  No new Education completed  []  Reviewed Prior HEP      [x]  Patient verbalized and/or demonstrated understanding of education provided. []  Patient unable to verbalize and/or demonstrate understanding of education provided. Will continue education.   []  Barriers to learning:     PLAN:  Treatment Recommendations: Strengthening, Range of Motion, Endurance Training, Neuromuscular Re-education, Manual Therapy - Soft Tissue Mobilization, Pain Management, Patient Education and Modalities    [x]  Plan of care initiated. Plan to see patient 3 times per week for 8 weeks to address the treatment planned outlined above.   []  Continue with current plan of care  []  Modify plan of care as follows:    []  Hold pending physician visit  []  Discharge    Time In 1301   Time Out 1353   Timed Code Minutes: 10 min   Total Treatment Time: 52 min       Electronically Signed by: Erich Munson PT

## 2021-11-11 ENCOUNTER — APPOINTMENT (OUTPATIENT)
Dept: PHYSICAL THERAPY | Age: 51
End: 2021-11-11
Payer: COMMERCIAL

## 2021-11-17 ENCOUNTER — HOSPITAL ENCOUNTER (OUTPATIENT)
Dept: PHYSICAL THERAPY | Age: 51
Setting detail: THERAPIES SERIES
End: 2021-11-17
Payer: COMMERCIAL

## 2021-11-19 ENCOUNTER — APPOINTMENT (OUTPATIENT)
Dept: PHYSICAL THERAPY | Age: 51
End: 2021-11-19
Payer: COMMERCIAL

## 2021-11-22 ENCOUNTER — APPOINTMENT (OUTPATIENT)
Dept: PHYSICAL THERAPY | Age: 51
End: 2021-11-22
Payer: COMMERCIAL

## 2021-11-24 ENCOUNTER — APPOINTMENT (OUTPATIENT)
Dept: PHYSICAL THERAPY | Age: 51
End: 2021-11-24
Payer: COMMERCIAL

## 2021-11-29 ENCOUNTER — APPOINTMENT (OUTPATIENT)
Dept: PHYSICAL THERAPY | Age: 51
End: 2021-11-29
Payer: COMMERCIAL

## 2021-12-03 ENCOUNTER — HOSPITAL ENCOUNTER (OUTPATIENT)
Dept: PHYSICAL THERAPY | Age: 51
Setting detail: THERAPIES SERIES
Discharge: HOME | End: 2021-12-03
Payer: COMMERCIAL

## 2021-12-29 NOTE — DISCHARGE SUMMARY
[facility]    Patient Name: Ewa Rees        CSN: [id number]   YOB: 1970  Gender: male  BUSHRA Messina - *,    Cervicalgia [M54.2]  Pain in thoracic spine [M54.6]  Radiculopathy, cervical region [M54.12]  Pain in unspecified limb [M79.609]  Paresthesia of skin [R20.2] ,      Patient is discharged from Physical Therapy services at this time. See last note for details related to results of therapy and goal achievement. Reason for discharge: Pt was incarcerated shortly after evaluation, re-scheduled after Pt was out of incarceration, Pt no-show/ no-call to scheudled appointment and did re-schedule after attempts to contact patient.  YADI FischerT

## 2022-01-20 ENCOUNTER — OFFICE VISIT (OUTPATIENT)
Dept: FAMILY MEDICINE CLINIC | Age: 52
End: 2022-01-20
Payer: COMMERCIAL

## 2022-01-20 VITALS
HEART RATE: 102 BPM | HEIGHT: 68 IN | RESPIRATION RATE: 14 BRPM | SYSTOLIC BLOOD PRESSURE: 158 MMHG | TEMPERATURE: 98.1 F | OXYGEN SATURATION: 99 % | BODY MASS INDEX: 25.7 KG/M2 | WEIGHT: 169.6 LBS | DIASTOLIC BLOOD PRESSURE: 90 MMHG

## 2022-01-20 DIAGNOSIS — M54.2 CERVICALGIA: ICD-10-CM

## 2022-01-20 DIAGNOSIS — I10 ESSENTIAL HYPERTENSION: Primary | ICD-10-CM

## 2022-01-20 DIAGNOSIS — M54.12 CERVICAL RADICULOPATHY: ICD-10-CM

## 2022-01-20 DIAGNOSIS — Z72.0 TOBACCO ABUSE: ICD-10-CM

## 2022-01-20 DIAGNOSIS — G89.29 CHRONIC BILATERAL THORACIC BACK PAIN: ICD-10-CM

## 2022-01-20 DIAGNOSIS — M54.6 CHRONIC BILATERAL THORACIC BACK PAIN: ICD-10-CM

## 2022-01-20 DIAGNOSIS — G56.01 RIGHT CARPAL TUNNEL SYNDROME: ICD-10-CM

## 2022-01-20 DIAGNOSIS — Z12.11 SCREEN FOR COLON CANCER: ICD-10-CM

## 2022-01-20 PROCEDURE — 4004F PT TOBACCO SCREEN RCVD TLK: CPT | Performed by: NURSE PRACTITIONER

## 2022-01-20 PROCEDURE — G8419 CALC BMI OUT NRM PARAM NOF/U: HCPCS | Performed by: NURSE PRACTITIONER

## 2022-01-20 PROCEDURE — 3017F COLORECTAL CA SCREEN DOC REV: CPT | Performed by: NURSE PRACTITIONER

## 2022-01-20 PROCEDURE — 99214 OFFICE O/P EST MOD 30 MIN: CPT | Performed by: NURSE PRACTITIONER

## 2022-01-20 PROCEDURE — G8484 FLU IMMUNIZE NO ADMIN: HCPCS | Performed by: NURSE PRACTITIONER

## 2022-01-20 PROCEDURE — G8427 DOCREV CUR MEDS BY ELIG CLIN: HCPCS | Performed by: NURSE PRACTITIONER

## 2022-01-20 RX ORDER — AMLODIPINE BESYLATE 5 MG/1
5 TABLET ORAL DAILY
Qty: 90 TABLET | Refills: 1 | Status: SHIPPED | OUTPATIENT
Start: 2022-01-20 | End: 2022-10-10

## 2022-01-20 RX ORDER — CYCLOBENZAPRINE HCL 5 MG
5 TABLET ORAL 2 TIMES DAILY PRN
Qty: 60 TABLET | Refills: 0 | Status: SHIPPED | OUTPATIENT
Start: 2022-01-20 | End: 2022-01-30

## 2022-01-20 RX ORDER — NAPROXEN 375 MG/1
375 TABLET ORAL 2 TIMES DAILY WITH MEALS
Qty: 180 TABLET | Refills: 1 | Status: SHIPPED | OUTPATIENT
Start: 2022-01-20

## 2022-01-20 ASSESSMENT — ENCOUNTER SYMPTOMS
GASTROINTESTINAL NEGATIVE: 1
BACK PAIN: 1
RESPIRATORY NEGATIVE: 1
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
## 2022-02-01 ENCOUNTER — HOSPITAL ENCOUNTER (OUTPATIENT)
Dept: PHYSICAL THERAPY | Age: 52
Setting detail: THERAPIES SERIES
Discharge: HOME | End: 2022-02-01
Payer: COMMERCIAL

## 2022-02-01 PROCEDURE — 97162 PT EVAL MOD COMPLEX 30 MIN: CPT

## 2022-02-01 NOTE — PROGRESS NOTES
** PLEASE SIGN, DATE AND TIME CERTIFICATION BELOW AND RETURN TO [facility] OUTPATIENT REHABILITATION (FAX #: [phone]). ATTEST/CO-SIGN IF ACCESSING VIA [employer]. THANK YOU.**    I certify that I have examined the patient below and determined that Physical Medicine and Rehabilitation service is necessary and that I approve the established plan of care for up to 90 days or as specifically noted. Attestation, signature or co-signature of physician indicates approval of certification requirements.    ________________________ ____________ __________  Physician Signature   Date   Time  [facility]  PHYSICAL THERAPY  [x] EVALUATION  [] DAILY NOTE (LAND) [] DAILY NOTE (AQUATIC ) [] PROGRESS NOTE [] DISCHARGE NOTE    [x] [facility]   [] [address]    [] [address] [facility]   [] Lurene Amel    Date: 2022  Patient Name:  Jody Crews  : 1970  MRN: [id number]  CSN: [id number]    Referring Practitioner BUSHRA Skelton - *   Diagnosis Cervicalgia [M54.2]  Pain in thoracic spine [M54.6]  Radiculopathy, cervical region [M54.12]  Pain in unspecified limb [M79.609]  Paresthesia of skin [R20.2]    Treatment Diagnosis Cervicalgia with right radiculopathy, Cephalgia, Thoracic pain, Upper body weakness   Date of Evaluation 22    Additional Pertinent History High BP, Stroke      Functional Outcome Measure Used NDI   Functional Outcome Score 19 (22)       Insurance: Primary: Payor: Letitia Shows /  /  / ,   Secondary:    Authorization Information: Eval only then auth needed. Max 20 visits per year. Visit # 1, 1/10 for progress note   Visits Allowed: 1   Recertification Date: 13   Physician Follow-Up: 22   Physician Orders:    History of Present Illness:      SUBJECTIVE: States saw doctor about 1-2 weeks ago and was told has a pinched nerve in his neck.  States had an ENG done showing that and he needs to go have more testing done.  has been having problems with his neck and back since 2010 and has been getting worse over time.  gets sporadic symptoms down his right arm. Reports did fall in 2010 and so not sure if injured self then. Reports no deficits from stroke had recently. States sometimes sees little while spots in his vision and last night he got real hot and dizzy and just had to lay down. Reports felt fine after sleeping. States that has happened 3x in last month. Social/Functional History and Current Status:  Medications and Allergies have been reviewed and are listed on Medical History Questionnaire. [address] [facility] Beth lives alone in a multiple floor home with stairs and a handrail to enter. Task Previous Current   ADLs  Independent Modified Independent   IADL's Independent Modified Independent   Ambulation Independent Independent - has problems with his knees   Transfers Independent Independent   Recreation Independent Modified Independent   Community Integration Independent Modified Independent   Driving Active  Active    Work Unemployed  Unemployed     OBJECTIVE:    Pain: 7/10 neck and back   Palpation Mild to moderate tenderness and tension noted bilat sides of neck. Severe tenderness at inferior angle left scapula and mild tenderness same place in right scapula. States more painful tpo palpation closer to spine   Observation Forward head and rounded raised shoulders   Posture Fair       Range of Motion Cervical flexion limited 50% with pain, Extension limited 50% with pain, Rotation left limited 50% with pain and rotation right limited 75% with more significant pain. WFL bilat UE but rep has a knot on right side shoulder that if holds arm up too long it will go numb   Strength Moderate cervical and scapular stabilizer weakness, Right shoulder and bicep/tricep 4-/5 and other motions 4/5. Left arm 4-5/5 throughout. Good  strength. Is right hand dominant. Coordination    Sensation States pain down right arm. Reports tingling down back of right leg. Bed Mobility    Transfers    Ambulation States bilat knees will buckle on him from issues with [employer] when was young. Stairs    Balance    Special Tests          TREATMENT   Precautions: Avoid strenuous activity   Pain: 7/10 neck and back    X in shaded column indicates activity completed today   Modalities Parameters/  Location  Notes                     Manual Therapy Time/Technique  Notes   Tried manual traction, occipital release and light myofascial work and patient could not tolerate any of it  X                Exercise/Intervention   Notes   Tried loaded cervical retractions light pressure x10 - made pain down left side mid back worse and did not change neck pain   X    Educated on posterior capsule stretch and using doorway or tennis ball to work on tightness along medial scapula bilat. Also educated on not sticking head out when watching TV   X                                                                     Specific Interventions Next Treatment: modalities with US and E-stim or combo, manual therapy once can tolerate, cervical retractions if can tolerate, progression to stretching and strengthening as can tolerate    Activity/Treatment Tolerance:  [x]  Patient tolerated treatment well  []  Patient limited by fatigue  [x]  Patient limited by pain   []  Patient limited by medical complications  []  Other:     Assessment: Patient with history of neck and back problems that has been gradually worsening. Patient with random symptoms happening all throughout his body from his head down to his legs. Patient also having vision issues and headaches. Patient not able to tolerate anything started in therapy today so will have to try some hands off treatments with modalities and see if can get pain under control and then try more manual treatments along with strengthening.    Body Structures/Functions/Activity Limitations: impaired activity tolerance, impaired endurance, impaired ROM, impaired sensation, impaired strength, pain and abnormal posture  Prognosis: fair    GOALS:  Patient Goal: To stop the pain in his neck and back. Short Term Goals:  Time Frame: 4 weeks  1) Patient to report 25-50% decrease in neck and back pain for ease with care of home  2) Patient to demonstrate 25-50% increase in cervical range without pain increase for ease when looking when driving  3) Patient to report able to move arms through full range and hold above head without numbness for self care and cleaning home  4) Patient to report no return of headaches for ability to read with increased ease  5) Patient to report able to consistently decrease symptoms down arms to carry things through the house      Long Term Goals:  Time Frame: 12 weeks  1) Patient to be independent with home program to perform all daily activity with minimal to no pain or difficulty       Patient Education:   [x]  HEP/Education Completed: Plan of Care, Goals, stretch above   [address] Access Code:  []  No new Education completed  []  Reviewed Prior HEP      [x]  Patient verbalized and/or demonstrated understanding of education provided. []  Patient unable to verbalize and/or demonstrate understanding of education provided. Will continue education. []  Barriers to learning: None    PLAN:  Treatment Recommendations: Strengthening, Range of Motion, Functional Mobility Training, Manual Therapy - Soft Tissue Mobilization, Pain Management, Home Exercise Program, Patient Education, Integrative Dry Needling and Modalities    [x]  Plan of care initiated. Plan to see patient 2 times per week for 12 weeks to address the treatment planned outlined above.   []  Continue with current plan of care  []  Modify plan of care as follows:    []  Hold pending physician visit  []  Discharge    Time In 1300   Time Out 1400   Timed Code Minutes: 0 min

## 2022-02-14 ENCOUNTER — HOSPITAL ENCOUNTER (OUTPATIENT)
Age: 52
Discharge: HOME | End: 2022-02-14
Payer: COMMERCIAL

## 2022-02-14 ENCOUNTER — HOSPITAL ENCOUNTER (OUTPATIENT)
Dept: GENERAL RADIOLOGY | Age: 52
Discharge: HOME | End: 2022-02-14
Payer: COMMERCIAL

## 2022-02-14 DIAGNOSIS — Z02.71 ENCOUNTER FOR DISABILITY DETERMINATION: ICD-10-CM

## 2022-02-14 PROCEDURE — 72100 X-RAY EXAM L-S SPINE 2/3 VWS: CPT

## 2022-02-14 PROCEDURE — 73560 X-RAY EXAM OF KNEE 1 OR 2: CPT

## 2022-02-15 ENCOUNTER — TELEPHONE (OUTPATIENT)
Dept: FAMILY MEDICINE CLINIC | Age: 52
End: 2022-02-15

## 2022-02-15 NOTE — TELEPHONE ENCOUNTER
Patient calling stating he had a physical for disability 02.14.2022 and BP was elevated   It was suggested that the patient reach out to his provider to have BP medication adjusted    I suggested that the patient contact the office where his physical was completed to send documentation from the visit to his provider for review    Patient has been informed and voiced understanding

## 2022-02-17 ENCOUNTER — HOSPITAL ENCOUNTER (OUTPATIENT)
Dept: PHYSICAL THERAPY | Age: 52
Setting detail: THERAPIES SERIES
Discharge: HOME | End: 2022-02-17
Payer: COMMERCIAL

## 2022-02-17 PROCEDURE — 97110 THERAPEUTIC EXERCISES: CPT

## 2022-02-17 PROCEDURE — 97140 MANUAL THERAPY 1/> REGIONS: CPT

## 2022-02-17 NOTE — PROGRESS NOTES
[facility]  PHYSICAL THERAPY  [] EVALUATION  [x] DAILY NOTE (LAND) [] DAILY NOTE (AQUATIC ) [] PROGRESS NOTE [] DISCHARGE NOTE    [x] OUTPATIENT REHABILITATION CENTER [facility]   [] [place] [address]    [] [facility]   [] Shirin Hindsy     Date: 2022  Patient Name:  Gladys Tello   : 1970  MRN: [id number]  CSN: [id number]    Referring Practitioner BUSHRA To - *   Diagnosis Cervicalgia [M54.2]  Pain in thoracic spine [M54.6]  Radiculopathy, cervical region [M54.12]  Pain in unspecified limb [M79.609]  Paresthesia of skin [R20.2]    Treatment Diagnosis Cervicalgia with right radiculopathy, Cephalgia, Thoracic pain, Upper body weakness   Date of Evaluation 22    Additional Pertinent History High BP, Stroke      Functional Outcome Measure Used NDI   Functional Outcome Score 19 (22)       Insurance: Primary: Payor:  [employer] /  /  / ,   Secondary:    Authorization Information: Eval only then auth needed. Max 20 visits per year. RECEIVED NOTIFICATION FROM ROSA ROJAS  PRIOR AUTHORIZATION REQUIRED AFTER INITIAL EVAL AND   12 VISITS   Visit # 2, 2/10 for progress note   Visits Allowed: Eval + 12 visits    Recertification Date:    Physician Follow-Up: 22   Physician Orders:    History of Present Illness:      SUBJECTIVE: Patient reports that on Monday he had x-rays done of his back, neck and knees. He denies pain currently but reports stiffness. He states that he is having stiffness at the right side of his neck and his left shoulder blade region.        Objective:   TREATMENT   Precautions: Avoid strenuous activity   Pain: Denies pain, \"stiffness\" right cervical region and medial left scapular region    X in shaded column indicates activity completed today   Modalities Parameters/  Location  Notes   MHP to upper thoracic region at the conclusion of session  5 min  X Noted relief from heat               Manual Therapy Time/Technique  Notes   Tried manual traction, occipital release and light myofascial work and patient could not tolerate any of it      Gentle STM to medial border of his left scapular, right scapular and right upper trap 12-15 min  X Tightness noted mostly at medial border of left scapular region. Noted tightness decreased after STM. Exercise/Intervention   Notes   Tried loaded cervical retractions light pressure x10 - made pain down left side mid back worse and did not change neck pain       Educated on posterior capsule stretch and using doorway or tennis ball to work on tightness along medial scapula bilat. Also educated on not sticking head out when watching TV              Upper trap stretch  15 sec  3x X Patient noted pain at center of back between his shoulder blades after performing, noted more discomfort when going to his right compared to left   Levator stretch  15 sec  3x X    Posterior capsule stretch  10 sec  3x L  X Performed at [facility] only today as he feels his right side wants to \"lock up\" when performing   Posterior shoulder rolls   2x5 X    Gentle scapular retraction   Held  X Increased shoulder blade pain so held   Gentle cervical retraction   2x5 X Noted some discomfort between shoulder blades but noted tolerable. Seated upper thoracic stretch over ball  5-10 sec  3x X Noted pain when coming back to resting position                   Specific Interventions Next Treatment: modalities with US and E-stim or combo, manual therapy once can tolerate, cervical retractions if can tolerate, progression to stretching and strengthening as can tolerate    Activity/Treatment Tolerance:  [x]  Patient tolerated treatment well  []  Patient limited by fatigue  [x]  Patient limited by pain   []  Patient limited by medical complications  []  Other:     Assessment: Added therapeutic exercises as documented above.  He was provided with demonstration and cues on proper technique with added exercises to ensure maximal muscle activation. He noted pain at times while performing his exercises as documented above. Patient's main complaint of pain and tightness was at the medial border of his left scapular region. Added manual therapy as documented above. Tightness noted during manual therapy. Ended session with MHP to patient's upper thoracic region. He reported that his tightness improved some at the conclusion of session and he denied any pain at the end of session. Body Structures/Functions/Activity Limitations: impaired activity tolerance, impaired endurance, impaired ROM, impaired sensation, impaired strength, pain and abnormal posture  Prognosis: fair    GOALS:  Patient Goal: To stop the pain in his neck and back. Short Term Goals:  Time Frame: 4 weeks  1) Patient to report 25-50% decrease in neck and back pain for ease with care of home  2) Patient to demonstrate 25-50% increase in cervical range without pain increase for ease when looking when driving  3) Patient to report able to move arms through full range and hold above head without numbness for self care and cleaning home  4) Patient to report no return of headaches for ability to read with increased ease  5) Patient to report able to consistently decrease symptoms down arms to carry things through the house      Long Term Goals:  Time Frame: 12 weeks  1) Patient to be independent with home program to perform all daily activity with minimal to no pain or difficulty       Patient Education:   [x]  HEP/Education Completed: Added therapeutic exercises, manual therapy and use of heat. Discussed with patient in monitoring his response to treatment session.  Monitor Access Code:  []  No new Education completed  []  Reviewed Prior HEP      [x]  Patient verbalized and/or demonstrated understanding of education provided. []  Patient unable to verbalize and/or demonstrate understanding of education provided. Will continue education.   []  Barriers to learning: None    PLAN:  Treatment Recommendations: Strengthening, Range of Motion, Functional Mobility Training, Manual Therapy - Soft Tissue Mobilization, Pain Management, Home Exercise Program, Patient Education, Integrative Dry Needling and Modalities    []  Plan of care initiated. Plan to see patient 2 times per week for 12 weeks to address the treatment planned outlined above.   [x]  Continue with current plan of care  []  Modify plan of care as follows:    []  Hold pending physician visit  []  Discharge    Time In 1348   Time Out 1434   Timed Code Minutes: 41 min   Total Treatment Time: 46 min       Electronically Signed by: Nena Velazco PTA

## 2022-02-21 ENCOUNTER — HOSPITAL ENCOUNTER (OUTPATIENT)
Dept: PHYSICAL THERAPY | Age: 52
Setting detail: THERAPIES SERIES
Discharge: HOME | End: 2022-02-21
Payer: COMMERCIAL

## 2022-02-21 PROCEDURE — 97110 THERAPEUTIC EXERCISES: CPT

## 2022-02-21 PROCEDURE — 97140 MANUAL THERAPY 1/> REGIONS: CPT

## 2022-02-21 NOTE — PROGRESS NOTES
[address] [facility]  PHYSICAL THERAPY  [] EVALUATION  [x] DAILY NOTE (LAND) [] DAILY NOTE (AQUATIC ) [] PROGRESS NOTE [] DISCHARGE NOTE    [x] OUTPATIENT REHABILITATION CENTER [facility]   [] [place] [address]    [] [facility]   [] Calvin Jones      Date: 2022  Patient Name:  Linda Mena.   : 1970   MRN: [id number]  CSN: [id number]    Referring Practitioner BUSHRA Leyva - *   Diagnosis Cervicalgia [M54.2]  Pain in thoracic spine [M54.6]  Radiculopathy, cervical region [M54.12]  Pain in unspecified limb [M79.609]  Paresthesia of skin [R20.2]    Treatment Diagnosis Cervicalgia with right radiculopathy, Cephalgia, Thoracic pain, Upper body weakness   Date of Evaluation 22    Additional Pertinent History High BP, Stroke      Functional Outcome Measure Used NDI   Functional Outcome Score 19 (22)       Insurance: Primary: Payor: Bobby Bruno /  /  / ,   Secondary:    Authorization Information: Eval only then auth needed. Max 20 visits per year. RECEIVED NOTIFICATION FROM ROSA ROJAS  PRIOR AUTHORIZATION REQUIRED AFTER INITIAL EVAL AND   12 VISITS   Visit # 3, 3/10 for progress note   Visits Allowed: Eval + 12 visits    Recertification Date: 16   Physician Follow-Up: 22   Physician Orders:    History of Present Illness:      SUBJECTIVE: Patient reports that after his last session he went home and took a muscle relaxer. He states that he fell asleep after taking the muscle relaxer. He reports compliance with HEP. He states that when he turns his head he still hears what sounds like \"rocks in a bag\". Patient denies pain currently but reports that he continues to have stiffness. He states that last night the way he was sleeping that he had pain at his upper back. He states that he changed the placement of his pillows with made his pain subside. He reports that he continues to randomly get a feeling of being lightheaded and dizzy. He states that this past weekend he got the feeling od being lightheaded but reports that after her laid down that he felt better. He states that his doctor is working on getting him on the right prescription of blood pressure medication. Objective:   TREATMENT   Precautions: Avoid strenuous activity   Pain: Denies pain, \"stiffness\" right cervical region and medial left scapular region    X in shaded column indicates activity completed today   Modalities Parameters/  Location  Notes   MHP to upper thoracic region at the conclusion of session  8 min  X Noted relief from heat               Manual Therapy Time/Technique  Notes   Tried manual traction, occipital release and light myofascial work and patient could not tolerate any of it      Gentle STM to medial border of his left scapular (primary focus), right scapular and right upper trap 13 min  X Tightness noted mostly at medial border of left scapular region. Noted tightness decreased after STM. Exercise/Intervention   Notes   Tried loaded cervical retractions light pressure x10 - made pain down left side mid back worse and did not change neck pain       Educated on posterior capsule stretch and using doorway or tennis ball to work on tightness along medial scapula bilat. Also educated on not sticking head out when watching TV              Upper trap stretch  15 sec  3x X Patient reported that the shoulder blade pain that he experienced last session was better today. Levator stretch  15 sec  3x X    Posterior capsule stretch  10 sec  3x L  X Performed at [facility] only today as he feels his right side wants to \"lock up\" when performing. Posterior shoulder rolls   10x X    Gentle scapular retraction   Held   Increased shoulder blade pain so held   Gentle cervical retraction   2x5 X Noted a \"pulse feeling\" at his upper thoracic region.    Seated upper thoracic stretch over ball- with arms going back into extension  5-10 sec  3x X Noted improved tolerance when performing with arms going into extension. Cues for proper technique. Seated dive stretch  10 sec  3x X    Cervical rotation- bilateral   5x each  X           Quadruped cat/camel   10x X                    Specific Interventions Next Treatment: modalities with US and E-stim or combo, manual therapy once can tolerate, cervical retractions if can tolerate, progression to stretching and strengthening as can tolerate    Activity/Treatment Tolerance:  [x]  Patient tolerated treatment well  []  Patient limited by fatigue  [x]  Patient limited by pain   []  Patient limited by medical complications  []  Other:     Assessment: Patient completed therapeutic exercises as documented above. He had improved tolerance to exercises this session. He was provided with occasional cues throughout session on proper technique with exercises to ensure maximal muscle activation. Added seated dive stretch, cervical rotation and quadruped cat/camel exercises. Continued with manual therapy this session to assist with decreasing patient's tightness. He reports that his tightness improved after manual therapy but noted more \"discomfort\". He denied pain. He reports that his stiffness comes back after sitting and not moving. Body Structures/Functions/Activity Limitations: impaired activity tolerance, impaired endurance, impaired ROM, impaired sensation, impaired strength, pain and abnormal posture  Prognosis: fair    GOALS:  Patient Goal: To stop the pain in his neck and back.     Short Term Goals:  Time Frame: 4 weeks  1) Patient to report 25-50% decrease in neck and back pain for ease with care of home  2) Patient to demonstrate 25-50% increase in cervical range without pain increase for ease when looking when driving  3) Patient to report able to move arms through full range and hold above head without numbness for self care and cleaning home  4) Patient to report no return of headaches for ability to read with increased ease  5) Patient to report able to consistently decrease symptoms down arms to carry things through the house      Long Term Goals:  Time Frame: 12 weeks  1) Patient to be independent with home program to perform all daily activity with minimal to no pain or difficulty       Patient Education:   [x]  HEP/Education Completed: Continue HEP, added therapeutic exercises, monitor response to treatment session, Patient provided with handouts of [id number] [employer]. Access Code: [email or web address]  []  No new Education completed  [x]  Reviewed Prior HEP      [x]  Patient verbalized and/or demonstrated understanding of education provided. []  Patient unable to verbalize and/or demonstrate understanding of education provided. Will continue education. []  Barriers to learning: None    PLAN:  Treatment Recommendations: Strengthening, Range of Motion, Functional Mobility Training, Manual Therapy - Soft Tissue Mobilization, Pain Management, Home Exercise Program, Patient Education, Integrative Dry Needling and Modalities    []  Plan of care initiated. Plan to see patient 2 times per week for 12 weeks to address the treatment planned outlined above.   [x]  Continue with current plan of care  []  Modify plan of care as follows:    []  Hold pending physician visit  []  Discharge    Time In 1330   Time Out 1418   Timed Code Minutes: 40 min   Total Treatment Time: 48 min       Electronically Signed by: Carlyn Healy PTA

## 2022-02-24 ENCOUNTER — HOSPITAL ENCOUNTER (OUTPATIENT)
Dept: PHYSICAL THERAPY | Age: 52
Setting detail: THERAPIES SERIES
Discharge: HOME | End: 2022-02-24
Payer: COMMERCIAL

## 2022-02-24 PROCEDURE — 97140 MANUAL THERAPY 1/> REGIONS: CPT

## 2022-02-24 PROCEDURE — 97110 THERAPEUTIC EXERCISES: CPT

## 2022-02-24 NOTE — PROGRESS NOTES
[address] [facility]  PHYSICAL THERAPY  [] EVALUATION  [x] DAILY NOTE (LAND) [] DAILY NOTE (AQUATIC ) [] PROGRESS NOTE [] DISCHARGE NOTE    [x] OUTPATIENT REHABILITATION CENTER [facility]   [] [place] [address]    [] [facility]   [] Susie Mcgraw      Date: 2022  Patient Name:  Nhan Dumont   : 1970   MRN: [id number]  CSN: [id number]    Referring Practitioner BUSHRA Lorenzo - *   Diagnosis Cervicalgia [M54.2]  Pain in thoracic spine [M54.6]  Radiculopathy, cervical region [M54.12]  Pain in unspecified limb [M79.609]  Paresthesia of skin [R20.2]    Treatment Diagnosis Cervicalgia with right radiculopathy, Cephalgia, Thoracic pain, Upper body weakness   Date of Evaluation 22    Additional Pertinent History High BP, Stroke      Functional Outcome Measure Used NDI   Functional Outcome Score 19 (22)       Insurance: Primary: Payor: Karina De Guzman /  /  / ,   Secondary:    Authorization Information: Eval only then auth needed. Max 20 visits per year. RECEIVED NOTIFICATION FROM ROSA ROJAS  PRIOR AUTHORIZATION REQUIRED AFTER INITIAL EVAL AND   12 VISITS   Visit # 4, 4/10 for progress note   Visits Allowed: Eval + 12 visits    Recertification Date:    Physician Follow-Up: 22   Physician Orders:    History of Present Illness:      SUBJECTIVE: Patient notes pain along mid back last night and had trouble sleeping. Patient overall feels she is making some progress and feels like his stiffness is less.      Objective:   TREATMENT   Precautions: Avoid strenuous activity   Pain: Denies pain, \"stiffness\" right cervical region and medial left scapular region    X in shaded column indicates activity completed today   Modalities Parameters/  Location  Notes   MHP to upper thoracic region at the conclusion of session  8 min   Noted relief from heat               Manual Therapy Time/Technique  Notes   Tried manual traction, occipital release and light myofascial work and patient could not tolerate any of it      Gentle STM to medial border of his left scapular (primary focus), right scapular and right upper trap 13 min  X Tightness noted mostly at medial border of left scapular region. Noted tightness decreased after STM. Exercise/Intervention   Notes   Tried loaded cervical retractions light pressure x10 - made pain down left side mid back worse and did not change neck pain       Educated on posterior capsule stretch and using doorway or tennis ball to work on tightness along medial scapula bilat. Also educated on not sticking head out when watching TV              Upper trap stretch  15 sec  3x X Patient reported that the shoulder blade pain that he experienced last session was better today. Levator stretch  15 sec  3x X    Posterior capsule stretch  10 sec  3x L  X Performed at E only today as he feels his right side wants to \"lock up\" when performing. Posterior shoulder rolls   10x X    Gentle scapular retraction   Held   Increased shoulder blade pain so held   Gentle cervical retraction   2x5 X Noted a \"pulse feeling\" at his upper thoracic region. Seated upper thoracic stretch over ball- with arms going back into extension  5-10 sec  3x X Noted improved tolerance when performing with arms going into extension. Cues for proper technique.     Seated dive stretch  10 sec  3x X    Cervical rotation- bilateral   5x each  X           Quadruped cat/camel   10x X Stood at wall today                    Specific Interventions Next Treatment: modalities with US and E-stim or combo, manual therapy once can tolerate, cervical retractions if can tolerate, progression to stretching and strengthening as can tolerate    Activity/Treatment Tolerance:  [x]  Patient tolerated treatment well  []  Patient limited by fatigue  [x]  Patient limited by pain   []  Patient limited by medical complications  []  Other:     Assessment: Initiated treatment session with therex to improve cervical mobility and postural alignment. Concluded treatment session with manual interventions to promote symptom relief. Patient overall tolerates well and notes today is a good day as far as his pain levels are. Body Structures/Functions/Activity Limitations: impaired activity tolerance, impaired endurance, impaired ROM, impaired sensation, impaired strength, pain and abnormal posture  Prognosis: fair    GOALS:  Patient Goal: To stop the pain in his neck and back. Short Term Goals:  Time Frame: 4 weeks  1) Patient to report 25-50% decrease in neck and back pain for ease with care of home  2) Patient to demonstrate 25-50% increase in cervical range without pain increase for ease when looking when driving  3) Patient to report able to move arms through full range and hold above head without numbness for self care and cleaning home  4) Patient to report no return of headaches for ability to read with increased ease  5) Patient to report able to consistently decrease symptoms down arms to carry things through the house      Long Term Goals:  Time Frame: 12 weeks  1) Patient to be independent with home program to perform all daily activity with minimal to no pain or difficulty       Patient Education:   [x]  HEP/Education Completed: Continue HEP, added therapeutic exercises, monitor response to treatment session, Patient provided with handouts of HEP   [address] Access Code: [email or web address]  []  No new Education completed  [x]  Reviewed Prior HEP      [x]  Patient verbalized and/or demonstrated understanding of education provided. []  Patient unable to verbalize and/or demonstrate understanding of education provided. Will continue education.   []  Barriers to learning: None    PLAN:  Treatment Recommendations: Strengthening, Range of Motion, Functional Mobility Training, Manual Therapy - Soft Tissue Mobilization, Pain Management, Home Exercise Program, Patient Education, Integrative Dry Needling and Modalities    []  Plan of care initiated. Plan to see patient 2 times per week for 12 weeks to address the treatment planned outlined above.   [x]  Continue with current plan of care  []  Modify plan of care as follows:    []  Hold pending physician visit  []  Discharge    Time In 1330   Time Out 1410   Timed Code Minutes: 40 min   Total Treatment Time: 40 min       Electronically Signed by: Joan Webster PTA

## 2022-03-01 ENCOUNTER — HOSPITAL ENCOUNTER (OUTPATIENT)
Dept: PHYSICAL THERAPY | Age: 52
Setting detail: THERAPIES SERIES
Discharge: HOME | End: 2022-03-01
Payer: COMMERCIAL

## 2022-03-01 PROCEDURE — 97035 APP MDLTY 1+ULTRASOUND EA 15: CPT

## 2022-03-01 PROCEDURE — 97140 MANUAL THERAPY 1/> REGIONS: CPT

## 2022-03-04 ENCOUNTER — HOSPITAL ENCOUNTER (OUTPATIENT)
Dept: PHYSICAL THERAPY | Age: 52
Setting detail: THERAPIES SERIES
Discharge: HOME | End: 2022-03-04
Payer: COMMERCIAL

## 2022-03-04 PROCEDURE — 97035 APP MDLTY 1+ULTRASOUND EA 15: CPT

## 2022-03-04 PROCEDURE — 97140 MANUAL THERAPY 1/> REGIONS: CPT

## 2022-03-04 NOTE — PROGRESS NOTES
[address] [facility]  PHYSICAL THERAPY  [] EVALUATION  [x] DAILY NOTE (LAND) [] DAILY NOTE (AQUATIC ) [] PROGRESS NOTE [] DISCHARGE NOTE    [x] OUTPATIENT REHABILITATION CENTER [facility]   [] [place] [address]    [] [facility]   [] [address] [facility]      Date: 3/4/2022  Patient Name:  Alvaro Montes.   : 1970   MRN: [id number]  CSN: [id number]    Referring Practitioner BUSHRA Thornton - *   Diagnosis Cervicalgia [M54.2]  Pain in thoracic spine [M54.6]  Radiculopathy, cervical region [M54.12]  Pain in unspecified limb [M79.609]  Paresthesia of skin [R20.2]    Treatment Diagnosis Cervicalgia with right radiculopathy, Cephalgia, Thoracic pain, Upper body weakness   Date of Evaluation 22    Additional Pertinent History High BP, Stroke      Functional Outcome Measure Used NDI   Functional Outcome Score 19 (22)       Insurance: Primary: Payor: Niecy Ashley /  /  / ,   Secondary:    Authorization Information: Eval only then auth needed. Max 20 visits per year. RECEIVED NOTIFICATION FROM ROSA ROJAS  PRIOR AUTHORIZATION REQUIRED AFTER INITIAL EVAL AND   12 VISITS   Visit # 6, 6/10 for progress note   Visits Allowed: Eval + 12 visits    Recertification Date: 04   Physician Follow-Up: 22   Physician Orders:    History of Present Illness:      SUBJECTIVE: Patient with complaints of pain still being worse on Right and going down between scapulae today. States he is using heat and working on exercises at home. Objective:   TREATMENT   Precautions: Avoid strenuous activity   Pain: 7/10 Mid-scapular region.      X in shaded column indicates activity completed today   Modalities Parameters/  Location  Notes   MHP to upper thoracic region at the conclusion of session  8 min   Noted relief from heat   US to right side neck in seated 1.0 MHz, 1.3 W/cm2 cont for 8 min X Pt reported pressure sensation down around lower cervical/upper thoracic then a muscle fluttering feeling around mid cervical and just an odd sensation at upper cervical   Ultrasound/Estim Combo            Manual Therapy Time/Technique  Notes   Manual traction 1-2 min x2 X Pt reported felt good when pulling but then agitated as let go. Limited tolerance of supine position   Gentle STM to medial border of his left scapular (primary focus), right scapular and right upper trap 13 min   Tightness noted mostly at medial border of left scapular region. Noted tightness decreased after STM. Myofascial and trigger point work to right side upper trap/levator/SCM  25 minutes in seated  X Patient with significant tightness right side   Exercise/Intervention   Notes   Tried loaded cervical retractions light pressure x10 - made pain down left side mid back worse and did not change neck pain       Educated on posterior capsule stretch and using doorway or tennis ball to work on tightness along medial scapula bilat. Also educated on not sticking head out when watching TV              Upper trap stretch  15 sec  3x  Patient reported that the shoulder blade pain that he experienced last session was better today. Levator stretch  15 sec  3x     Posterior capsule stretch  10 sec  3x L   Performed at LUE only today as he feels his right side wants to \"lock up\" when performing. Posterior shoulder rolls   10x     Gentle scapular retraction   Held   Increased shoulder blade pain so held   Gentle cervical retraction   2x5  Noted a \"pulse feeling\" at his upper thoracic region. Seated upper thoracic stretch over ball- with arms going back into extension  5-10 sec  3x  Noted improved tolerance when performing with arms going into extension. Cues for proper technique.     Seated dive stretch  10 sec  3x     Cervical rotation- bilateral   5x each             Quadruped cat/camel   10x  Stood at wall today                    Specific Interventions Next Treatment: modalities with US and E-stim or combo, manual therapy once can tolerate, cervical retractions if can tolerate, progression to stretching and strengthening as can tolerate    Activity/Treatment Tolerance:  [x]  Patient tolerated treatment well  []  Patient limited by fatigue  [x]  Patient limited by pain   []  Patient limited by medical complications  []  Other:     Assessment: Ultrasound completed prior to manual work to warm-up tissue and decrease pain. Focused on manual work today to work on decreasing tightness. Patient may benefit from trial of ultrasound/e-stim combo treatment to assist with decreasing pain. Patient does not tolerate supine position well. Patient has a small lump at Right thoracic paraspinal region that is characteristic of a cyst. Instructed patient to inform physician at next appointment of lump. GOALS:  Patient Goal: To stop the pain in his neck and back. Short Term Goals:  Time Frame: 4 weeks  1) Patient to report 25-50% decrease in neck and back pain for ease with care of home  2) Patient to demonstrate 25-50% increase in cervical range without pain increase for ease when looking when driving  3) Patient to report able to move arms through full range and hold above head without numbness for self care and cleaning home  4) Patient to report no return of headaches for ability to read with increased ease  5) Patient to report able to consistently decrease symptoms down arms to carry things through the house      Long Term Goals:  Time Frame: 12 weeks  1) Patient to be independent with home program to perform all daily activity with minimal to no pain or difficulty       Patient Education:   [x]  HEP/Education Completed: Continue HEP and see how feels after manual work today.  [facility] Access Code: [email or web address]  []  No new Education completed  [x]  Reviewed Prior HEP      [x]  Patient verbalized and/or demonstrated understanding of education provided. []  Patient unable to verbalize and/or demonstrate understanding of education provided. Will continue education. []  Barriers to learning: None    PLAN:  Treatment Recommendations: Strengthening, Range of Motion, Functional Mobility Training, Manual Therapy - Soft Tissue Mobilization, Pain Management, Home Exercise Program, Patient Education, Integrative Dry Needling and Modalities    []  Plan of care initiated. Plan to see patient 2 times per week for 12 weeks to address the treatment planned outlined above.   [x]  Continue with current plan of care  []  Modify plan of care as follows:    []  Hold pending physician visit  []  Discharge    Time In 1300   Time Out 1345   Timed Code Minutes: 45 min   Total Treatment Time: 45 min       Electronically Signed by: Asa Abreu PTA

## 2022-03-07 ENCOUNTER — OFFICE VISIT (OUTPATIENT)
Dept: FAMILY MEDICINE CLINIC | Age: 52
End: 2022-03-07
Payer: COMMERCIAL

## 2022-03-07 VITALS
OXYGEN SATURATION: 99 % | BODY MASS INDEX: 25.07 KG/M2 | RESPIRATION RATE: 12 BRPM | TEMPERATURE: 98.1 F | DIASTOLIC BLOOD PRESSURE: 98 MMHG | HEART RATE: 102 BPM | SYSTOLIC BLOOD PRESSURE: 140 MMHG | WEIGHT: 165.4 LBS | HEIGHT: 68 IN

## 2022-03-07 DIAGNOSIS — M54.12 CERVICAL RADICULOPATHY: ICD-10-CM

## 2022-03-07 DIAGNOSIS — I10 ESSENTIAL HYPERTENSION: Primary | ICD-10-CM

## 2022-03-07 DIAGNOSIS — D36.7 DERMOID CYST OF HEAD: ICD-10-CM

## 2022-03-07 DIAGNOSIS — G89.29 CHRONIC BILATERAL THORACIC BACK PAIN: ICD-10-CM

## 2022-03-07 DIAGNOSIS — Z72.0 TOBACCO ABUSE: ICD-10-CM

## 2022-03-07 DIAGNOSIS — M54.6 CHRONIC BILATERAL THORACIC BACK PAIN: ICD-10-CM

## 2022-03-07 DIAGNOSIS — M54.2 CERVICALGIA: ICD-10-CM

## 2022-03-07 DIAGNOSIS — E78.2 MIXED HYPERLIPIDEMIA: ICD-10-CM

## 2022-03-07 PROCEDURE — 99214 OFFICE O/P EST MOD 30 MIN: CPT | Performed by: NURSE PRACTITIONER

## 2022-03-07 PROCEDURE — G8419 CALC BMI OUT NRM PARAM NOF/U: HCPCS | Performed by: NURSE PRACTITIONER

## 2022-03-07 PROCEDURE — G8427 DOCREV CUR MEDS BY ELIG CLIN: HCPCS | Performed by: NURSE PRACTITIONER

## 2022-03-07 PROCEDURE — 3017F COLORECTAL CA SCREEN DOC REV: CPT | Performed by: NURSE PRACTITIONER

## 2022-03-07 PROCEDURE — 4004F PT TOBACCO SCREEN RCVD TLK: CPT | Performed by: NURSE PRACTITIONER

## 2022-03-07 PROCEDURE — G8484 FLU IMMUNIZE NO ADMIN: HCPCS | Performed by: NURSE PRACTITIONER

## 2022-03-07 RX ORDER — LOSARTAN POTASSIUM 25 MG/1
25 TABLET ORAL DAILY
Qty: 90 TABLET | Refills: 1 | Status: SHIPPED | OUTPATIENT
Start: 2022-03-07

## 2022-03-07 SDOH — ECONOMIC STABILITY: FOOD INSECURITY: WITHIN THE PAST 12 MONTHS, THE FOOD YOU BOUGHT JUST DIDN'T LAST AND YOU DIDN'T HAVE MONEY TO GET MORE.: NEVER TRUE

## 2022-03-07 SDOH — ECONOMIC STABILITY: FOOD INSECURITY: WITHIN THE PAST 12 MONTHS, YOU WORRIED THAT YOUR FOOD WOULD RUN OUT BEFORE YOU GOT MONEY TO BUY MORE.: NEVER TRUE

## 2022-03-07 ASSESSMENT — ENCOUNTER SYMPTOMS
RESPIRATORY NEGATIVE: 1
GASTROINTESTINAL NEGATIVE: 1
BACK PAIN: 1

## 2022-03-07 ASSESSMENT — SOCIAL DETERMINANTS OF HEALTH (SDOH): HOW HARD IS IT FOR YOU TO PAY FOR THE VERY BASICS LIKE FOOD, HOUSING, MEDICAL CARE, AND HEATING?: NOT HARD AT ALL
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
## 2022-03-08 ENCOUNTER — APPOINTMENT (OUTPATIENT)
Dept: PHYSICAL THERAPY | Age: 52
End: 2022-03-08
Payer: COMMERCIAL

## 2022-03-09 ENCOUNTER — TELEPHONE (OUTPATIENT)
Dept: FAMILY MEDICINE CLINIC | Age: 52
End: 2022-03-09

## 2022-03-09 NOTE — TELEPHONE ENCOUNTER
MRI thoracic and MRI cervical on 03/23/22 @ 6:30pm, arrival time 6pm. main radiology. no metal. [facility]. Called pt and advised. He voiced understanding.

## 2022-03-23 ENCOUNTER — TELEPHONE (OUTPATIENT)
Dept: FAMILY MEDICINE CLINIC | Age: 52
End: 2022-03-23

## 2022-03-23 ENCOUNTER — HOSPITAL ENCOUNTER (OUTPATIENT)
Dept: MRI IMAGING | Age: 52
Discharge: HOME | End: 2022-03-23
Payer: COMMERCIAL

## 2022-03-23 DIAGNOSIS — G89.29 CHRONIC BILATERAL THORACIC BACK PAIN: ICD-10-CM

## 2022-03-23 DIAGNOSIS — M54.6 CHRONIC BILATERAL THORACIC BACK PAIN: ICD-10-CM

## 2022-03-23 DIAGNOSIS — M54.12 CERVICAL RADICULOPATHY: ICD-10-CM

## 2022-03-23 DIAGNOSIS — M54.2 CERVICALGIA: ICD-10-CM

## 2022-03-23 PROCEDURE — 72146 MRI CHEST SPINE W/O DYE: CPT

## 2022-03-23 PROCEDURE — 72141 MRI NECK SPINE W/O DYE: CPT

## 2022-03-23 NOTE — TELEPHONE ENCOUNTER
----- Message from Woody Hernandez sent at 3/23/2022  2:33 PM EDT -----  Subject: Referral Request    QUESTIONS   Reason for referral request? PT is in need of a referral due to needing   lumps removed from his right side of forehead and right eye. Has the physician seen you for this condition before? Yes  Select a date? 2022-03-07  Select the Provider the patient wants to be referred to, if known (PCP or   Specialist)? Outside Physician - PT is unsure of Drs name number to office   is [phone]   Preferred Specialist (if applicable)? Do you already have an appointment scheduled? No  Additional Information for Provider? PT would like a call when this   referral is sent over. ---------------------------------------------------------------------------  --------------  Harvey HERNADEZ  What is the best way for the office to contact you? Do not leave any   message, patient will call back for answer  Preferred Call Back Phone Number?  [phone]

## 2022-03-23 NOTE — TELEPHONE ENCOUNTER
I phoned pt to talk with him regarding his needs. He states the letter does have specific requirements, he also stated he is working with a disability provider for his neck and back pain. I did advise it may be beneficial for him to contact them for the letter as they will have his specific requirements needed. Pt voiced understanding and agreed.

## 2022-03-23 NOTE — TELEPHONE ENCOUNTER
----- Message from [facility] sent at 3/23/2022 10:43 AM EDT -----  Subject: Message to Provider    QUESTIONS  Information for Provider? patient would like for Tereso to fax over a notice   to child support that he is currently not working. The fax number he would   like it faxed to is [phone]. Also patient says he referred to a plastic   surgeon and he would like the phone number to reach out the doctor to   schedule. please reach out to patient for any further question   ---------------------------------------------------------------------------  --------------  CALL BACK INFO  What is the best way for the office to contact you? OK to leave message on   voicemail  Preferred Call Back Phone Number? [phone]  ---------------------------------------------------------------------------  --------------  SCRIPT ANSWERS  Relationship to Patient?  Self

## 2022-03-23 NOTE — TELEPHONE ENCOUNTER
A referral for Dr. Darell Valdez was  placed for this pt on 3/7/2022 and pt was given Dr. Darell Valdez phone number earlier today, if dr. Darell Valdez office does not have the referral please refax. thanks!

## 2022-03-23 NOTE — TELEPHONE ENCOUNTER
Pt was referred to Dr. Aleyda Morgan so please give him that phone number. Also ask pt why he is not working and for how long. And he was to f/u with me this week for  A visit. Pleas have him schedule this appt also!  Thanks

## 2022-03-23 NOTE — TELEPHONE ENCOUNTER
Dr Adriana Bueno ph # [phone]  Pt informed and verbalized understanding. Pt states he's been off work since [place].  d/t neck and back pain  Pt states he has an appt in april    Pt needs the letter for child support  Future Appointments   Date Time Provider Jennifer Ramirez   3/23/2022  6:30 PM STR MRI RM1 STRZ MRI STR Radiolog   3/23/2022  7:00 PM STR MRI RM1 STRZ MRI STR Radiolog   4/19/2022  3:40 PM BUSHRA Martin - CNP [facility]. [facility] IMM[place] - [place]

## 2022-03-24 ENCOUNTER — TELEPHONE (OUTPATIENT)
Dept: FAMILY MEDICINE CLINIC | Age: 52
End: 2022-03-24

## 2022-03-24 DIAGNOSIS — M25.78 CERVICAL OSTEOPHYTE: ICD-10-CM

## 2022-03-24 DIAGNOSIS — M54.12 CERVICAL RADICULOPATHY: Primary | ICD-10-CM

## 2022-03-24 DIAGNOSIS — M54.2 CERVICALGIA: ICD-10-CM

## 2022-03-24 NOTE — TELEPHONE ENCOUNTER
Patient has been informed and voiced understanding and agrees with the referral for PM and has also been informed that once the referral was placed and all demographics faxed over to PM: PM will reach out to him to get him scheduled for an appointment

## 2022-03-24 NOTE — TELEPHONE ENCOUNTER
----- Message from BUSHRA Armstrong CNP sent at 3/24/2022  9:38 AM EDT -----  Let pt know his cervical MRI identifies multiple osteophytes(bone growths) in his cervical spine. These are bone spurs that can be causing his pain. By pressing on the surrounding skin and tissue. He has multiple ones so they can not all be removed. There are no ruptured discs or fractures. If pain not controlled with current methods we can refer him to an management to see if any injections or procedures can help to alleviate his pain.   Also let him know his thoracic MRI was normal.

## 2022-04-14 ENCOUNTER — OFFICE VISIT (OUTPATIENT)
Dept: PHYSICAL MEDICINE AND REHAB | Age: 52
End: 2022-04-14
Payer: COMMERCIAL

## 2022-04-14 VITALS
WEIGHT: 166.8 LBS | HEIGHT: 68 IN | SYSTOLIC BLOOD PRESSURE: 146 MMHG | DIASTOLIC BLOOD PRESSURE: 94 MMHG | BODY MASS INDEX: 25.28 KG/M2

## 2022-04-14 DIAGNOSIS — M54.12 CERVICAL RADICULITIS: ICD-10-CM

## 2022-04-14 DIAGNOSIS — M79.18 MYOFASCIAL PAIN: ICD-10-CM

## 2022-04-14 DIAGNOSIS — M54.12 CERVICAL RADICULOPATHY: Primary | ICD-10-CM

## 2022-04-14 DIAGNOSIS — G89.4 CHRONIC PAIN SYNDROME: ICD-10-CM

## 2022-04-14 PROCEDURE — G8427 DOCREV CUR MEDS BY ELIG CLIN: HCPCS | Performed by: NURSE PRACTITIONER

## 2022-04-14 PROCEDURE — 20553 NJX 1/MLT TRIGGER POINTS 3/>: CPT | Performed by: NURSE PRACTITIONER

## 2022-04-14 PROCEDURE — 99215 OFFICE O/P EST HI 40 MIN: CPT | Performed by: NURSE PRACTITIONER

## 2022-04-14 PROCEDURE — G8419 CALC BMI OUT NRM PARAM NOF/U: HCPCS | Performed by: NURSE PRACTITIONER

## 2022-04-14 PROCEDURE — 4004F PT TOBACCO SCREEN RCVD TLK: CPT | Performed by: NURSE PRACTITIONER

## 2022-04-14 PROCEDURE — 3017F COLORECTAL CA SCREEN DOC REV: CPT | Performed by: NURSE PRACTITIONER

## 2022-04-14 RX ORDER — METHOCARBAMOL 100 MG/ML
100 INJECTION, SOLUTION INTRAMUSCULAR; INTRAVENOUS ONCE
Status: COMPLETED | OUTPATIENT
Start: 2022-04-14 | End: 2022-04-14

## 2022-04-14 RX ORDER — PREGABALIN 50 MG/1
50 CAPSULE ORAL NIGHTLY
Qty: 30 CAPSULE | Refills: 0 | Status: SHIPPED | OUTPATIENT
Start: 2022-04-14 | End: 2022-05-17 | Stop reason: SDUPTHER

## 2022-04-14 RX ADMIN — METHOCARBAMOL 100 MG: 100 INJECTION, SOLUTION INTRAMUSCULAR; INTRAVENOUS at 12:24
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
## 2022-04-21 ENCOUNTER — OFFICE VISIT (OUTPATIENT)
Dept: FAMILY MEDICINE CLINIC | Age: 52
End: 2022-04-21
Payer: COMMERCIAL

## 2022-04-21 VITALS
RESPIRATION RATE: 12 BRPM | WEIGHT: 161.2 LBS | HEART RATE: 95 BPM | HEIGHT: 68 IN | DIASTOLIC BLOOD PRESSURE: 78 MMHG | OXYGEN SATURATION: 97 % | TEMPERATURE: 98 F | BODY MASS INDEX: 24.43 KG/M2 | SYSTOLIC BLOOD PRESSURE: 122 MMHG

## 2022-04-21 DIAGNOSIS — Z87.891 PERSONAL HISTORY OF TOBACCO USE, PRESENTING HAZARDS TO HEALTH: ICD-10-CM

## 2022-04-21 DIAGNOSIS — Z00.00 ENCOUNTER FOR WELL ADULT EXAM WITHOUT ABNORMAL FINDINGS: Primary | ICD-10-CM

## 2022-04-21 DIAGNOSIS — D17.0 LIPOMA OF FOREHEAD: ICD-10-CM

## 2022-04-21 DIAGNOSIS — I10 ESSENTIAL HYPERTENSION: ICD-10-CM

## 2022-04-21 DIAGNOSIS — Z12.5 PROSTATE CANCER SCREENING: ICD-10-CM

## 2022-04-21 PROBLEM — M54.12 CERVICAL RADICULOPATHY: Status: ACTIVE | Noted: 2022-04-21

## 2022-04-21 PROCEDURE — 99406 BEHAV CHNG SMOKING 3-10 MIN: CPT | Performed by: NURSE PRACTITIONER

## 2022-04-21 PROCEDURE — 99396 PREV VISIT EST AGE 40-64: CPT | Performed by: NURSE PRACTITIONER

## 2022-04-21 RX ORDER — PREGABALIN 50 MG/1
50 CAPSULE ORAL NIGHTLY
Qty: 30 CAPSULE | Refills: 0 | Status: CANCELLED | OUTPATIENT
Start: 2022-04-21 | End: 2022-05-21

## 2022-04-21 ASSESSMENT — PATIENT HEALTH QUESTIONNAIRE - PHQ9
SUM OF ALL RESPONSES TO PHQ QUESTIONS 1-9: 0
2. FEELING DOWN, DEPRESSED OR HOPELESS: 0
SUM OF ALL RESPONSES TO PHQ QUESTIONS 1-9: 0
1. LITTLE INTEREST OR PLEASURE IN DOING THINGS: 0
SUM OF ALL RESPONSES TO PHQ9 QUESTIONS 1 & 2: 0

## 2022-04-21 ASSESSMENT — ENCOUNTER SYMPTOMS
GASTROINTESTINAL NEGATIVE: 1
BACK PAIN: 1
RESPIRATORY NEGATIVE: 1
RHINORRHEA: 0

## 2022-04-21 NOTE — PATIENT INSTRUCTIONS
Well Visit, Men 48 to 72: Care Instructions  Overview     Well visits can help you stay healthy. Your doctor has checked your overall health and may have suggested ways to take good care of yourself. Your doctor also may have recommended tests. At home, you can help prevent illness withhealthy eating, regular exercise, and other steps. Follow-up care is a key part of your treatment and safety. Be sure to make and go to all appointments, and call your doctor if you are having problems. It's also a good idea to know your test results and keep alist of the medicines you take. How can you care for yourself at home?  Get screening tests that you and your doctor decide on. Screening helps find diseases before any symptoms appear.  Eat healthy foods. Choose fruits, vegetables, whole grains, protein, and low-fat dairy foods. Limit fat, especially saturated fat. Reduce salt in your diet.  Limit alcohol. Have no more than 2 drinks a day or 14 drinks a week.  Get at least 30 minutes of exercise on most days of the week. Walking is a good choice. You also may want to do other activities, such as running, swimming, cycling, or playing tennis or team sports.  Reach and stay at a healthy weight. This will lower your risk for many problems, such as obesity, diabetes, heart disease, and high blood pressure.  Do not smoke. Smoking can make health problems worse. If you need help quitting, talk to your doctor about stop-smoking programs and medicines. These can increase your chances of quitting for good.  Care for your mental health. It is easy to get weighed down by worry and stress. Learn strategies to manage stress, like deep breathing and mindfulness, and stay connected with your family and community. If you find you often feel sad or hopeless, talk with your doctor. Treatment can help.  Talk to your doctor about whether you have any risk factors for sexually transmitted infections (STIs).  You can help prevent STIs if you wait to have sex with a new partner (or partners) until you've each been tested for STIs. It also helps if you use condoms (male or female condoms) and if you limit your sex partners to one person who only has sex with you. Vaccines are available for some STIs.  If it's important to you to prevent pregnancy with your partner, talk with your doctor about birth control options that might be best for you.  If you think you may have a problem with alcohol or drug use, talk to your doctor. This includes prescription medicines (such as amphetamines and opioids) and illegal drugs (such as cocaine and methamphetamine). Your doctor can help you figure out what type of treatment is best for you.  Protect your skin from too much sun. When you're outdoors from 10 a.m. to 4 p.m., stay in the shade or cover up with clothing and a hat with a wide brim. Wear sunglasses that block UV rays. Even when it's cloudy, put broad-spectrum sunscreen (SPF 30 or higher) on any exposed skin.  See a dentist one or two times a year for checkups and to have your teeth cleaned.  Wear a seat belt in the car. When should you call for help? Watch closely for changes in your health, and be sure to contact your doctor if you have any problems or symptoms that concern you. Where can you learn more? Go to https://[facility]. org and sign in to your [facility] account. Enter G483 in the [facility] box to learn more about \"Well Visit, Men 48 to 72: Care Instructions. \"     If you do not have an account, please click on the \"Sign Up Now\" link. Current as of: October 6, 2021               Content Version: 13.2  © 9272-8973 [employer]wise, [employer]. Care instructions adapted under license by [facility] ([facility]).  If you have questions about a medical condition or this instruction, always ask your healthcare professional. [address] any warranty or liability for your use of this information.
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
## 2022-04-26 ENCOUNTER — HOSPITAL ENCOUNTER (EMERGENCY)
Age: 52
Discharge: HOME | End: 2022-04-27
Attending: EMERGENCY MEDICINE
Payer: COMMERCIAL

## 2022-04-26 ENCOUNTER — APPOINTMENT (OUTPATIENT)
Dept: CT IMAGING | Age: 52
End: 2022-04-26
Payer: COMMERCIAL

## 2022-04-26 VITALS
RESPIRATION RATE: 17 BRPM | WEIGHT: 170 LBS | HEIGHT: 69 IN | DIASTOLIC BLOOD PRESSURE: 95 MMHG | OXYGEN SATURATION: 97 % | SYSTOLIC BLOOD PRESSURE: 152 MMHG | BODY MASS INDEX: 25.18 KG/M2 | HEART RATE: 99 BPM | TEMPERATURE: 98 F

## 2022-04-26 DIAGNOSIS — M54.6 CHRONIC MIDLINE THORACIC BACK PAIN: ICD-10-CM
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
## 2022-04-26 DIAGNOSIS — G89.29 CHRONIC MIDLINE THORACIC BACK PAIN: ICD-10-CM

## 2022-04-26 PROCEDURE — 76376 3D RENDER W/INTRP POSTPROCES: CPT

## 2022-04-26 PROCEDURE — 99284 EMERGENCY DEPT VISIT MOD MDM: CPT

## 2022-04-26 PROCEDURE — 71250 CT THORAX DX C-: CPT

## 2022-04-26 ASSESSMENT — PAIN - FUNCTIONAL ASSESSMENT: PAIN_FUNCTIONAL_ASSESSMENT: 0-10

## 2022-04-26 ASSESSMENT — PAIN DESCRIPTION - LOCATION: LOCATION: BACK

## 2022-04-26 ASSESSMENT — PAIN DESCRIPTION - FREQUENCY: FREQUENCY: CONTINUOUS

## 2022-04-26 ASSESSMENT — PAIN DESCRIPTION - DESCRIPTORS: DESCRIPTORS: ACHING

## 2022-04-26 ASSESSMENT — PAIN DESCRIPTION - ORIENTATION: ORIENTATION: MID

## 2022-04-26 ASSESSMENT — PAIN DESCRIPTION - PAIN TYPE: TYPE: ACUTE PAIN

## 2022-04-26 ASSESSMENT — PAIN SCALES - GENERAL: PAINLEVEL_OUTOF10: 5

## 2022-04-26 ASSESSMENT — PAIN DESCRIPTION - ONSET: ONSET: ON-GOING

## 2022-04-27 PROBLEM — G89.29 CHRONIC MIDLINE THORACIC BACK PAIN: Status: ACTIVE | Noted: 2022-04-27

## 2022-04-27 PROBLEM — M54.6 CHRONIC MIDLINE THORACIC BACK PAIN: Status: ACTIVE | Noted: 2022-04-27
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
## 2022-05-03 ENCOUNTER — TELEPHONE (OUTPATIENT)
Dept: FAMILY MEDICINE CLINIC | Age: 52
End: 2022-05-03

## 2022-05-03 DIAGNOSIS — D17.0 LIPOMA OF FOREHEAD: Primary | ICD-10-CM

## 2022-05-03 NOTE — TELEPHONE ENCOUNTER
----- Message from Yariel Camara sent at 5/3/2022  2:04 PM EDT -----  Subject: Message to Provider    QUESTIONS  Information for Provider? Patient has a name for a surgeon in network to   remove the knot. The name is Ruben Brannon. He needs the referral for   this. Will bring rest of the info when he comes in Thursday. Thanks  ---------------------------------------------------------------------------  --------------  Sh[facility] INFO  What is the best way for the office to contact you? OK to leave message on   voicemail  Preferred Call Back Phone Number? [phone]  ---------------------------------------------------------------------------  --------------  SCRIPT ANSWERS  Relationship to Patient?  Self

## 2022-05-05 ENCOUNTER — OFFICE VISIT (OUTPATIENT)
Dept: FAMILY MEDICINE CLINIC | Age: 52
End: 2022-05-05
Payer: COMMERCIAL

## 2022-05-05 VITALS
RESPIRATION RATE: 14 BRPM | SYSTOLIC BLOOD PRESSURE: 118 MMHG | DIASTOLIC BLOOD PRESSURE: 86 MMHG | HEIGHT: 69 IN | WEIGHT: 164.2 LBS | HEART RATE: 105 BPM | BODY MASS INDEX: 24.32 KG/M2 | TEMPERATURE: 97.9 F | OXYGEN SATURATION: 98 %

## 2022-05-05 DIAGNOSIS — M62.838 MUSCLE SPASM: ICD-10-CM

## 2022-05-05 DIAGNOSIS — G89.29 CHRONIC BILATERAL THORACIC BACK PAIN: ICD-10-CM
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
## 2022-05-05 DIAGNOSIS — M54.2 CERVICALGIA: ICD-10-CM

## 2022-05-05 DIAGNOSIS — M54.6 CHRONIC BILATERAL THORACIC BACK PAIN: ICD-10-CM

## 2022-05-05 DIAGNOSIS — S13.4XXD WHIPLASH INJURY TO NECK, SUBSEQUENT ENCOUNTER: ICD-10-CM

## 2022-05-05 PROCEDURE — 4004F PT TOBACCO SCREEN RCVD TLK: CPT | Performed by: NURSE PRACTITIONER

## 2022-05-05 PROCEDURE — G8420 CALC BMI NORM PARAMETERS: HCPCS | Performed by: NURSE PRACTITIONER

## 2022-05-05 PROCEDURE — 99213 OFFICE O/P EST LOW 20 MIN: CPT | Performed by: NURSE PRACTITIONER

## 2022-05-05 PROCEDURE — G8427 DOCREV CUR MEDS BY ELIG CLIN: HCPCS | Performed by: NURSE PRACTITIONER

## 2022-05-05 PROCEDURE — 3017F COLORECTAL CA SCREEN DOC REV: CPT | Performed by: NURSE PRACTITIONER

## 2022-05-05 RX ORDER — TIZANIDINE 4 MG/1
4 TABLET ORAL NIGHTLY PRN
Qty: 30 TABLET | Refills: 0 | Status: SHIPPED | OUTPATIENT
Start: 2022-05-05 | End: 2022-08-30

## 2022-05-05 ASSESSMENT — ENCOUNTER SYMPTOMS
BACK PAIN: 1
RESPIRATORY NEGATIVE: 1

## 2022-05-05 NOTE — PATIENT INSTRUCTIONS
Patient Education        Learning About How to Have a Healthy Back  What causes back pain? Back pain is often caused by overuse, strain, or injury. For example, people often hurt their backs playing sports or working in the yard, being jolted in acar accident, or lifting something too heavy. Aging plays a part too. Your bones and muscles tend to lose strength as you age, which makes injury more likely. The spongy discs between the bones of the spine (vertebrae) may suffer from wear and tear and no longer provide enough cushion between the bones. A disc that bulges or breaks open (herniated disc)can press on nerves, causing back pain. In some people, back pain is the result of arthritis, broken vertebrae causedby bone loss (osteoporosis), illness, or a spine problem. Although most people have back pain at one time or another, there are steps youcan take to make it less likely. How can you have a healthy back? Reduce stress on your back through good posture   Slumping or slouching alone may not cause low back pain. But after the back hasbeen strained or injured, bad posture can make pain worse.  Sleep in a position that maintains your back's normal curves and on a mattress that feels comfortable. Sleep on your side with a pillow between your knees, or sleep on your back with a pillow under your knees. These positions can reduce strain on your back.  Stand and sit up straight. \"Good posture\" generally means your ears, shoulders, and hips are in a straight line.  If you must stand for a long time, put one foot on a stool, ledge, or box. Switch feet every now and then.  Sit in a chair that is low enough to let you place both feet flat on the floor with both knees nearly level with your hips. If your chair or desk is too high, use a footrest to raise your knees. Place a small pillow, a rolled-up towel, or a lumbar roll in the curve of your back if you need extra support.    Try a kneeling chair, which helps tilt your hips forward. This takes pressure off your lower back.  Try sitting on an exercise ball. It can rock from side to side, which helps keep your back loose.  When driving, keep your knees nearly level with your hips. Sit straight, and drive with both hands on the steering wheel. Your arms should be in a slightly bent position. Reduce stress on your back through careful lifting    Squat down, bending at the hips and knees only. If you need to, put one knee to the floor and extend your other knee in front of you, bent at a right angle (half kneeling).  Press your chest straight forward. This helps keep your upper back straight while keeping a slight arch in your low back.  Hold the load as close to your body as possible, at the level of your belly button (navel).  Use your feet to change direction, taking small steps.  Lead with your hips as you change direction. Keep your shoulders in line with your hips as you move.  Set down your load carefully, squatting with your knees and hips only. Exercise and stretch your back    Do some exercise on most days of the week, if your doctor says it is okay. You can walk, run, swim, or cycle.  Stretch your back muscles. Here are a few exercises to try:  ? Lie on your back, and gently pull one bent knee to your chest. Put that foot back on the floor, and then pull the other knee to your chest.  ? Do pelvic tilts. Lie on your back with your knees bent. Tighten your stomach muscles. Pull your belly button (navel) in and up toward your ribs. You should feel like your back is pressing to the floor and your hips and pelvis are slightly lifting off the floor. Hold for 6 seconds while breathing smoothly. ? Sit with your back flat against a wall.  Keep your core muscles strong. The muscles of your back, belly (abdomen), and buttocks support your spine. ? Pull in your belly and imagine pulling your navel toward your spine.  Hold this for 6 seconds, then relax. Remember to keep breathing normally as you tense your muscles. ? Do curl-ups. Always do them with your knees bent. Keep your low back on the floor, and curl your shoulders toward your knees using a smooth, slow motion. Keep your arms folded across your chest. If this bothers your neck, try putting your hands behind your neck (not your head), with your elbows spread apart. ? Lie on your back with your knees bent and your feet flat on the floor. Tighten your belly muscles, and then push with your feet and raise your buttocks up a few inches. Hold this position 6 seconds as you continue to breathe normally, then lower yourself slowly to the floor. Repeat 8 to 12 times. ? If you like group exercise, try Pilates or yoga. These classes have poses that strengthen the core muscles. Lead a healthy lifestyle    Stay at a healthy weight to avoid strain on your back.  Do not smoke. Smoking increases the risk of osteoporosis, which weakens the spine. If you need help quitting, talk to your doctor about stop-smoking programs and medicines. These can increase your chances of quitting for good. Where can you learn more? Go to https://SummifypeHealthEdgeeb.360Guanxi. org and sign in to your CloudMade account. Enter L315 in the Fyber box to learn more about \"Learning About How to Have a Healthy Back. \"     If you do not have an account, please click on the \"Sign Up Now\" link. Current as of: July 1, 2021               Content Version: 13.2  © 2006-2022 Healthwise, Incorporated. Care instructions adapted under license by [facility] ([facility]). If you have questions about a medical condition or this instruction, always ask your healthcare professional. [place] [address] any warranty or liability for your use of this information. Patient Education        Neck Spasm: Exercises  Introduction  Here are some examples of exercises for you to try.  The exercises may be suggested for a condition or for rehabilitation. Start each exercise slowly. Ease off the exercises if you start to have pain. You will be told when to start these exercises and which ones will work bestfor you. How to do the exercises  Levator scapula stretch    1. Sit in a firm chair, or stand up straight. 2. Gently tilt your head toward your left shoulder. 3. Turn your head to look down into your armpit, bending your head slightly forward. Let the weight of your head stretch your neck muscles. 4. Hold for 15 to 30 seconds. 5. Return to your starting position. 6. Follow the same instructions above, but tilt your head toward your right shoulder. 7. Repeat 2 to 4 times toward each shoulder. Upper trapezius stretch    1. Sit in a firm chair, or stand up straight. 2. This stretch works best if you keep your shoulder down as you lean away from it. To help you remember to do this, start by relaxing your shoulders and lightly holding on to your thighs or your chair. 3. Tilt your head toward your shoulder and hold for 15 to 30 seconds. Let the weight of your head stretch your muscles. 4. If you would like a little added stretch, place your arm behind your back. Use the arm opposite of the direction you are tilting your head. For example, if you are tilting your head to the left, place your right arm behind your back. 5. Repeat 2 to 4 times toward each shoulder. Neck rotation    1. Sit in a firm chair, or stand up straight. 2. Keeping your chin level, turn your head to the right, and hold for 15 to 30 seconds. 3. Turn your head to the left, and hold for 15 to 30 seconds. 4. Repeat 2 to 4 times to each side. Chin tuck    1. Lie on the floor with a rolled-up towel under your neck. Your head should be touching the floor. 2. Slowly bring your chin toward the front of your neck. 3. Hold for a count of 6, and then relax for up to 10 seconds. 4. Repeat 8 to 12 times. Forward neck flexion    1.  Sit in a firm chair, or stand up straight. 2. Bend your head forward. 3. Hold for 15 to 30 seconds, then return to your starting position. 4. Repeat 2 to 4 times. Follow-up care is a key part of your treatment and safety. Be sure to make and go to all appointments, and call your doctor if you are having problems. It's also a good idea to know your test results and keep alist of the medicines you take. Where can you learn more? Go to https://CYA TechnologiespeAlytics.GT Advanced Technologies. org and sign in to your Yabbedoo account. Enter P962 in the FUNGO STUDIOS box to learn more about \"Neck Spasm: Exercises. \"     If you do not have an account, please click on the \"Sign Up Now\" link. Current as of: July 1, 2021               Content Version: 13.2  © 2006-2022 Healthwise, [employer]. Care instructions adapted under license by [facility] ([facility]). If you have questions about a medical condition or this instruction, always ask your healthcare professional. [place] [address] any warranty or liability for your use of this information. Patient Education        Neck Strain or Sprain: Rehab Exercises  Introduction  Here are some examples of exercises for you to try. The exercises may be suggested for a condition or for rehabilitation. Start each exercise slowly. Ease off the exercises if you start to have pain. You will be told when to start these exercises and which ones will work bestfor you. How to do the exercises  Neck rotation    1. Sit in a firm chair, or stand up straight. 2. Keeping your chin level, turn your head to the right, and hold for 15 to 30 seconds. 3. Turn your head to the left and hold for 15 to 30 seconds. 4. Repeat 2 to 4 times to each side. Neck stretches    1. Look straight ahead, and tip your right ear to your right shoulder. Do not let your left shoulder rise up as you tip your head to the right. 2. Hold for 15 to 30 seconds. 3. Tilt your head to the left.  Do not let your right shoulder rise up as you tip your head to the left. 4. Hold for 15 to 30 seconds. 5. Repeat 2 to 4 times to each side. Forward neck flexion    1. Sit in a firm chair, or stand up straight. 2. Bend your head forward. 3. Hold for 15 to 30 seconds. 4. Repeat 2 to 4 times. Lateral (side) bend strengthening    1. With your right hand, place your first two fingers on your right temple. 2. Start to bend your head to the side while using gentle pressure from your fingers to keep your head from bending. 3. Hold for about 6 seconds. 4. Repeat 8 to 12 times. 5. Switch hands and repeat the same exercise on your left side. Forward bend strengthening    1. Place your first two fingers of either hand on your forehead. 2. Start to bend your head forward while using gentle pressure from your fingers to keep your head from bending. 3. Hold for about 6 seconds. 4. Repeat 8 to 12 times. Neutral position strengthening    1. Using one hand, place your fingertips on the back of your head at the top of your neck. 2. Start to bend your head backward while using gentle pressure from your fingers to keep your head from bending. 3. Hold for about 6 seconds. 4. Repeat 8 to 12 times. Chin tuck    1. Lie on the floor with a rolled-up towel under your neck. Your head should be touching the floor. 2. Slowly bring your chin toward your chest.  3. Hold for a count of 6, and then relax for up to 10 seconds. 4. Repeat 8 to 12 times. Follow-up care is a key part of your treatment and safety. Be sure to make and go to all appointments, and call your doctor if you are having problems. It's also a good idea to know your test results and keep alist of the medicines you take. Where can you learn more? Go to https://Athletic StandardpeZero Motorcycles.AlgEvolve. org and sign in to your PICS Auditing account. Enter M679 in the Vacation View box to learn more about \"Neck Strain or Sprain: Rehab Exercises. \"     If you do not have an account, please click on the \"Sign Up Now\" link.  Current as of: July 1, 2021               Content Version: 13.2  © 6114-7027 Healthwise, Incorporated. Care instructions adapted under license by [facility] ([facility]). If you have questions about a medical condition or this instruction, always ask your healthcare professional. [address] any warranty or liability for your use of this information.
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
## 2022-05-11 ENCOUNTER — TELEPHONE (OUTPATIENT)
Dept: FAMILY MEDICINE CLINIC | Age: 52
End: 2022-05-11

## 2022-05-11 NOTE — TELEPHONE ENCOUNTER
Patient requesting a letter for child support stating that he is unable to work until after he has surgery (not scheduled) do to back issues  Patient confirmed that he see's PM for pain control and that they are trying everything before opting to do surgery

## 2022-05-11 NOTE — TELEPHONE ENCOUNTER
I can not verify if he can work due to his Pain. I recommend he contact PM and see if they can help him out with a letter. Since they are managing his pain ad have a god idea of what he is capable of physically.

## 2022-05-12 ENCOUNTER — TELEPHONE (OUTPATIENT)
Dept: PHYSICAL MEDICINE AND REHAB | Age: 52
End: 2022-05-12
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
## 2022-05-13 NOTE — TELEPHONE ENCOUNTER
No work restrictions or letter from us. I do not take people off work for the chronic pain I am seeing him for.

## 2022-05-17 ENCOUNTER — OFFICE VISIT (OUTPATIENT)
Dept: PHYSICAL MEDICINE AND REHAB | Age: 52
End: 2022-05-17
Payer: COMMERCIAL

## 2022-05-17 VITALS
DIASTOLIC BLOOD PRESSURE: 90 MMHG | HEIGHT: 69 IN | BODY MASS INDEX: 24.29 KG/M2 | WEIGHT: 164 LBS | SYSTOLIC BLOOD PRESSURE: 142 MMHG

## 2022-05-17 DIAGNOSIS — M54.6 CHRONIC MIDLINE THORACIC BACK PAIN: ICD-10-CM

## 2022-05-17 DIAGNOSIS — G89.4 CHRONIC PAIN SYNDROME: ICD-10-CM

## 2022-05-17 DIAGNOSIS — M54.12 CERVICAL RADICULOPATHY: Primary | ICD-10-CM

## 2022-05-17 DIAGNOSIS — M79.18 MYOFASCIAL PAIN: ICD-10-CM

## 2022-05-17 DIAGNOSIS — G89.29 CHRONIC MIDLINE THORACIC BACK PAIN: ICD-10-CM

## 2022-05-17 DIAGNOSIS — M54.12 CERVICAL RADICULITIS: ICD-10-CM

## 2022-05-17 DIAGNOSIS — M47.814 THORACIC SPONDYLOSIS: ICD-10-CM

## 2022-05-17 PROCEDURE — 99214 OFFICE O/P EST MOD 30 MIN: CPT | Performed by: NURSE PRACTITIONER

## 2022-05-17 PROCEDURE — G8420 CALC BMI NORM PARAMETERS: HCPCS | Performed by: NURSE PRACTITIONER

## 2022-05-17 PROCEDURE — 3017F COLORECTAL CA SCREEN DOC REV: CPT | Performed by: NURSE PRACTITIONER

## 2022-05-17 PROCEDURE — G8427 DOCREV CUR MEDS BY ELIG CLIN: HCPCS | Performed by: NURSE PRACTITIONER

## 2022-05-17 PROCEDURE — 4004F PT TOBACCO SCREEN RCVD TLK: CPT | Performed by: NURSE PRACTITIONER

## 2022-05-17 RX ORDER — PREGABALIN 50 MG/1
50 CAPSULE ORAL NIGHTLY
Qty: 30 CAPSULE | Refills: 0 | Status: SHIPPED | OUTPATIENT
Start: 2022-05-17 | End: 2022-06-16
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
## 2022-05-18 ENCOUNTER — TELEPHONE (OUTPATIENT)
Dept: PHYSICAL MEDICINE AND REHAB | Age: 52
End: 2022-05-18

## 2022-05-24 NOTE — TELEPHONE ENCOUNTER
Since pt. Already has paid for/ picked up Lyrica do you want him to just start gabapentin after Lyrica done.

## 2022-05-24 NOTE — TELEPHONE ENCOUNTER
Patient is returning call regarding this. Unable to reach clinical staff at this time. Please return call to the patient.

## 2022-05-24 NOTE — TELEPHONE ENCOUNTER
Please let patient know insurance denied Lyrica due to needs to trial other medications for nerve pain. We can trial Neurontin if he is willing to trial this?  We can discuss more at his next appointment as well

## 2022-05-24 NOTE — TELEPHONE ENCOUNTER
Patient calling in regarding this, he said he has a money account to help with medical expenses so he just pays for the lyrica with that and he did  the prescription today.

## 2022-05-24 NOTE — TELEPHONE ENCOUNTER
LVM that insurance denied Lyrica and can trial Gabapentin if willing to try. To call us back if ok with and to discuss further at next f/u.

## 2022-06-14 ENCOUNTER — TELEPHONE (OUTPATIENT)
Dept: FAMILY MEDICINE CLINIC | Age: 52
End: 2022-06-14

## 2022-06-20 ENCOUNTER — TELEPHONE (OUTPATIENT)
Dept: FAMILY MEDICINE CLINIC | Age: 52
End: 2022-06-20

## 2022-06-20 NOTE — TELEPHONE ENCOUNTER
Pt dropped off disability paperwork I recommend he have them sent to his PM provider or I can send him to OT for a disability examination.  Let me know his preference  thanks

## 2022-06-30 ENCOUNTER — TELEPHONE (OUTPATIENT)
Dept: PHYSICAL MEDICINE AND REHAB | Age: 52
End: 2022-06-30

## 2022-06-30 ENCOUNTER — OFFICE VISIT (OUTPATIENT)
Dept: PHYSICAL MEDICINE AND REHAB | Age: 52
End: 2022-06-30
Payer: COMMERCIAL

## 2022-06-30 VITALS
SYSTOLIC BLOOD PRESSURE: 114 MMHG | HEIGHT: 69 IN | WEIGHT: 164 LBS | BODY MASS INDEX: 24.29 KG/M2 | DIASTOLIC BLOOD PRESSURE: 72 MMHG

## 2022-06-30 DIAGNOSIS — G89.4 CHRONIC PAIN SYNDROME: ICD-10-CM

## 2022-06-30 DIAGNOSIS — M79.18 MYOFASCIAL PAIN: ICD-10-CM

## 2022-06-30 DIAGNOSIS — G89.29 CHRONIC MIDLINE THORACIC BACK PAIN: ICD-10-CM

## 2022-06-30 DIAGNOSIS — M54.12 CERVICAL RADICULOPATHY: Primary | ICD-10-CM

## 2022-06-30 DIAGNOSIS — M47.814 THORACIC SPONDYLOSIS: ICD-10-CM

## 2022-06-30 DIAGNOSIS — M54.6 CHRONIC MIDLINE THORACIC BACK PAIN: ICD-10-CM

## 2022-06-30 DIAGNOSIS — M54.12 CERVICAL RADICULITIS: ICD-10-CM

## 2022-06-30 PROCEDURE — 99214 OFFICE O/P EST MOD 30 MIN: CPT | Performed by: NURSE PRACTITIONER

## 2022-06-30 PROCEDURE — 4004F PT TOBACCO SCREEN RCVD TLK: CPT | Performed by: NURSE PRACTITIONER

## 2022-06-30 PROCEDURE — G8427 DOCREV CUR MEDS BY ELIG CLIN: HCPCS | Performed by: NURSE PRACTITIONER

## 2022-06-30 PROCEDURE — 3017F COLORECTAL CA SCREEN DOC REV: CPT | Performed by: NURSE PRACTITIONER

## 2022-06-30 PROCEDURE — G8420 CALC BMI NORM PARAMETERS: HCPCS | Performed by: NURSE PRACTITIONER
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
## 2022-07-21 ENCOUNTER — PREP FOR PROCEDURE (OUTPATIENT)
Dept: PHYSICAL MEDICINE AND REHAB | Age: 52
End: 2022-07-21

## 2022-07-28 ENCOUNTER — TELEPHONE (OUTPATIENT)
Dept: PHYSICAL MEDICINE AND REHAB | Age: 52
End: 2022-07-28

## 2022-07-28 NOTE — TELEPHONE ENCOUNTER
Spoke with Mariano-Merary. States that pt. is incarcerated and will not be able to come in for procedure and follow up. He will call back to reschedule.

## 2022-08-30 DIAGNOSIS — S13.4XXD WHIPLASH INJURY TO NECK, SUBSEQUENT ENCOUNTER: ICD-10-CM

## 2022-08-30 DIAGNOSIS — M54.2 CERVICALGIA: ICD-10-CM

## 2022-08-30 DIAGNOSIS — M62.838 MUSCLE SPASM: ICD-10-CM

## 2022-08-30 RX ORDER — TIZANIDINE 4 MG/1
TABLET ORAL
Qty: 30 TABLET | Refills: 0 | Status: SHIPPED | OUTPATIENT
Start: 2022-08-30

## 2022-09-19 ENCOUNTER — TELEPHONE (OUTPATIENT)
Dept: PHYSICAL MEDICINE AND REHAB | Age: 52
End: 2022-09-19

## 2022-09-19 NOTE — TELEPHONE ENCOUNTER
Pt. Called the office. Requests procedure rescheduled. Procedure and follow up rescheduled. Educated on pre-procedure instructions. Verbalized understanding.

## 2022-09-22 ENCOUNTER — HOSPITAL ENCOUNTER (OUTPATIENT)
Dept: PHYSICAL THERAPY | Age: 52
Setting detail: THERAPIES SERIES
Discharge: HOME | End: 2022-09-22
Payer: COMMERCIAL

## 2022-09-22 PROCEDURE — 97110 THERAPEUTIC EXERCISES: CPT

## 2022-09-22 PROCEDURE — 97161 PT EVAL LOW COMPLEX 20 MIN: CPT

## 2022-09-22 NOTE — PROGRESS NOTES
** PLEASE SIGN, DATE AND TIME CERTIFICATION BELOW AND RETURN TO [facility] OUTPATIENT REHABILITATION (FAX #: [phone]). ATTEST/CO-SIGN IF ACCESSING VIA INWelcome Real-time. THANK YOU.**    I certify that I have examined the patient below and determined that Physical Medicine and Rehabilitation service is necessary and that I approve the established plan of care for up to 90 days or as specifically noted. Attestation, signature or co-signature of physician indicates approval of certification requirements.    ________________________ ____________ __________  Physician Signature   Date   Time  [facility]  PHYSICAL THERAPY  [x] EVALUATION  [] DAILY NOTE (LAND) [] DAILY NOTE (AQUATIC ) [] PROGRESS NOTE [] DISCHARGE NOTE    [x] [facility]   [] [place] [address]    [] [facility]   [] [facility]    Date: 2022  Patient Name:  Kianna Pickett  : 1970  MRN: [id number]  CSN: [id number]    Referring Practitioner Kanika Blake MD   Diagnosis Other intervertebral disc degeneration, lumbosacral region [M51.37]  Spondylosis without myelopathy or radiculopathy, lumbosacral region [M47.817]  Sciatica, right side [M54.31]    Treatment Diagnosis Chronic neck and back pain, decreased cervical, thoracic, lumbar and hip ROM, core weakness   Date of Evaluation 22    Additional Pertinent History HTN, stroke. Functional Outcome Measure Used Modified Oswestry   Functional Outcome Score 26/50=52% (22)       Insurance: Primary: Payor: [employer] DESTINY /  /  / ,   Secondary:    Authorization Information: INSURANCE THERAPY BENEFIT: No precert until after 42OJ visit. Visit Limit Based on Precert  AQUATIC THERAPY COVERED:   Yes  MODALITIES COVERED:  Yes except for Iontophoresis and Hot/Cold Packs.   TELEHEALTH COVERED: No   Visit # 1, 1/10 for progress note   Visits Allowed: 30   Recertification Date:    Physician Follow-Up: Upon completion of PT; Epidural 10/15/22   Physician Orders:    History of Present Illness: Pt presents with chronic lumbar pain that has been progressively worsening for past 3 years. Pt began having trouble doing his job duties. Pain affects sleep. Pain affects position tolerance. Lifting, return to stand from bending over, standing with prolonged sitting cause pain. Right leg pain throbbing occurs during the night. Pt has pain in neck/upper back as well. XR/MRI showed disc slipping which may be causing pinched nerve in back. Pt taking prescribed Lyrica and Naproxen for pain control. Pt notes heat helps. SUBJECTIVE: Pt c/o low back pain. Social/Functional History and Current Status:  Medications and Allergies have been reviewed and are listed on Medical History Questionnaire. Carien Valdes lives with spouse and alone in a multiple floor home with 4 stairs and a handrail to enter. Bedroom and bathroom on 2nd floor, bathroom on main level. Pt fixing up first floor bedroom that he can eventually stay in. Pt notes he gets SOB when he gets to stop of stairs.      Task Previous Current   ADLs  Independent Modified Independent completes slower and more carefully   IADL's Independent Modified Independent- small loads of laundry, uses pressure cooker or oven more to prevent long term standing, vacuuming is painful   Ambulation Independent Independent   Transfers Independent Independent   Recreation Independent- play with grand kids [id number]   Driving Does not drive Does not drive   Work Unemployed -filed for disability Unemployed     Objective:    OBSERVATION   Pain 6/10 low back and neck  9-10/10 at worst  4/10 at best, manageable level  RLE radiating pain when he lays down, intermittently, tingling sensation if he lays a certain way   Palpation Tender across lumbar region, knots in thoracic and lumbar paraspinals   Sensation intact   Edema Spinal Accessory Motion    Bed Mobility Sleeps left sidelying for comfort d/t right neck pain, supine irritates pain     Transfers independent   Ambulation    Stairs    Balance        POSTURE    No Deficit Deficit Comments   Forward Head x     Rounded Shoulders  x    Kyphosis x     Lordosis  x increased   Lateral Shift x     Scoliosis x     Iliac Crest x     PSIS x     ASIS      Leg Length Discrp x     Slumped Sitting          TRUNK RANGE OF MOTION   Flexion: Reaches to ankles with pain   Extension: 50%- decreases pain   Lateral Flexion Left: 50% with pain    Lateral Flexion Right: 50% with pain   Rotation Left: 50%   Rotation Right: 50%   Trunk Range of Motion is [facility]  []     Neck rotation right 22 deg, left 23 deg; lateral flexion limited with upper trap tightness  Thoracic rotation right WFL, left limited 25% with tightness    LOWER EXTREMITY RANGE OF MOTION    Left Right Comments   Hip Flexion knee to chest 50% 40%    Hip Extension [facility]    Hip ABDuction 50% 50%    Hip ADDuction      Hip Internal Rotation 50% 50%    Hip External Rotation 25% 25%    Hip Range of Motion is [facility]  []      Knee Flexion      Knee Extension      Knee Range of Motion is [facility]  [x]       LOWER EXTREMITY STRENGTH    Left Right Comments   Hip Flexion 5 5    Hip Abduction 5 5    Hip Adduction      Hip Extension      Hip External Rotation      Knee Flexion      Knee Extension      Ankle DF 5 5    Ankle PF      LE strength is WFL []      CORE STRENGTH   B SLR TEST:   unable to tolerate, core weakness noted with bridge       SPECIAL TESTS (+/-)    Left Right Comments   SLR  + +    90/90 Hamstring length      SKTC + +    DKTC + +    Slump      SI Compression/Distraction      RYDER - -    FADIR + +    Harvinder Jason          TREATMENT   Precautions:    Pain: 6/10 neck and low back    \"X in shaded column indicates activity completed today    *\" next to exercise/intervention indicates progression   Modalities Parameters/  Location Notes                     Manual Therapy Time/Technique  Notes                     Exercise/Intervention   Notes   Educated on pool benefits, guidelines and location   x           Prone lying 2 min  x Increased low back pain                                                             Specific Interventions Next Treatment: alternate aquatics and land for neck and back ROM, strengthening, pain management, manual and modalities as needed    Activity/Treatment Tolerance:  [x]  Patient tolerated treatment well  []  Patient limited by fatigue  []  Patient limited by pain   []  Patient limited by medical complications  []  Other:     Assessment: 47year old presents with progressively worsening chronic neck and back pain. Pt demos decreased neck rotation and lateral flexion, left thoracic rotation, lumbar and hip ROM. Pt demos core weakness with difficulty sitting, standing, walking, lifting and cleaning. Pain affects his sleep. Pt will benefit from skilled PT, alternating land and aquatics, to reduce pain and improve ROM, strength, and mobility. Body Structures/Functions/Activity Limitations: impaired ROM, impaired strength, pain, and abnormal posture  Prognosis: good      Goals    Patient Goal:Be painless, do daily activity without pain    Short Term Goals: 4 weeks  Patient will improve AROM of cervical rotation to 35 degrees for scanning environment. Patient will demonstrate good core engagement and postural awareness during therapy session with <3 cues to decrease spinal strain when lifting and cooking. Patient will have <50% lumbar and hip AROM restriction for ease completing ADLs. Long Term Goals: 8 weeks  Patient will demonstrate good body mechanics with lifting and cleaning to decrease spinal stress. Patient will tolerate standing >10 minutes walking >30 minutes at grocery store with < 4/10 pain. Patient will get >4 hours of sleep with medication with less pain in neck and back upon waking. Patient will be independent and compliant with HEP to achieve above goals. Patient Education:   [x]  HEP/Education Completed: Plan of Care, Goals, attendance policy, pool guidelines and location  Medbridge Access Code:  []  No new Education completed  []  Reviewed Prior HEP      [x]  Patient verbalized and/or demonstrated understanding of education provided. []  Patient unable to verbalize and/or demonstrate understanding of education provided. Will continue education. []  Barriers to learning:     PLAN:  Treatment Recommendations: Strengthening, Range of Motion, Manual Therapy - Soft Tissue Mobilization, Pain Management, Home Exercise Program, Patient Education, Integrative Dry Needling, Aquatics, and Modalities    [x]  Plan of care initiated. Plan to see patient 2 times per week for 8 weeks to address the treatment planned outlined above.   []  Continue with current plan of care  []  Modify plan of care as follows:    []  Hold pending physician visit  []  Discharge    Time In 1010   Time Out 1055   Timed Code Minutes: 8 min   Total Treatment Time: 45 min     Electronically Signed by: Byron Merino PT

## 2022-09-27 ENCOUNTER — HOSPITAL ENCOUNTER (OUTPATIENT)
Dept: PHYSICAL THERAPY | Age: 52
Setting detail: THERAPIES SERIES
Discharge: HOME | End: 2022-09-27
Payer: COMMERCIAL

## 2022-09-27 PROCEDURE — 97113 AQUATIC THERAPY/EXERCISES: CPT

## 2022-10-04 ENCOUNTER — HOSPITAL ENCOUNTER (OUTPATIENT)
Dept: PHYSICAL THERAPY | Age: 52
Setting detail: THERAPIES SERIES
Discharge: HOME | End: 2022-10-04
Payer: COMMERCIAL

## 2022-10-04 PROCEDURE — 97113 AQUATIC THERAPY/EXERCISES: CPT

## 2022-10-04 NOTE — PROGRESS NOTES
[address] [facility]  PHYSICAL THERAPY  [] EVALUATION  [] DAILY NOTE (LAND) [x] DAILY NOTE (AQUATIC ) [] PROGRESS NOTE [] DISCHARGE NOTE    [x] OUTPATIENT REHABILITATION CENTER [facility]   [] [place] [address]    [] [facility]   [] [facility]    Date: 10/4/2022  Patient Name:  Rashaad Ruiz  : 1970  MRN: [id number]  CSN: [id number]    Referring Practitioner Dahlia Khan MD   Diagnosis Other intervertebral disc degeneration, lumbosacral region [M51.37]  Spondylosis without myelopathy or radiculopathy, lumbosacral region [M47.817]  Sciatica, right side [M54.31]    Treatment Diagnosis Chronic neck and back pain, decreased cervical, thoracic, lumbar and hip ROM, core weakness   Date of Evaluation 22    Additional Pertinent History HTN, stroke. Functional Outcome Measure Used Modified Oswestry   Functional Outcome Score 26/50=52% (22)       Insurance: Primary: Payor: [employer] DESTINY /  /  / ,   Secondary:    Authorization Information: INSURANCE THERAPY BENEFIT: No precert until after 07NR visit. Visit Limit Based on Precert  AQUATIC THERAPY COVERED:   Yes  MODALITIES COVERED:  Yes except for Iontophoresis and Hot/Cold Packs. TELEHEALTH COVERED: No   Visit # 3, 3/10 for progress note   Visits Allowed: Pre-cert: AQUATICS ONLY AFTER EVAL AND 12 VISITS HAVE BEEN USED. MAX 20 VISITS PT/OT/ST/PULMONARY PER CALENDAR YEAR, HARD LIMIT    Recertification Date:    Physician Follow-Up: Upon completion of PT; Epidural 10/15/22   Physician Orders:    History of Present Illness: Pt presents with chronic lumbar pain that has been progressively worsening for past 3 years. Pt began having trouble doing his job duties. Pain affects sleep. Pain affects position tolerance. Lifting, return to stand from bending over, standing with prolonged sitting cause pain. Right leg pain throbbing occurs during the night.  Pt has pain in neck/upper back as well. XR/MRI showed disc slipping which may be causing pinched nerve in back. Pt taking prescribed Lyrica and Naproxen for pain control. Pt notes heat helps. SUBJECTIVE: Pt states walking down stairs to do laundry on Saturday and reports RLE \"gave out\" and he fell down the remaining 3 steps. He noted that he rotated into his R shoulder and twisted his low back. Notes high pain level in middle of low back. AQUATICS TREATMENT   Precautions:    Pain: 8/10 middle LB    X in shaded column indicates activity completed today   Exercise/Intervention Sets/Sec  Notes   Walk Forward 2lap  X 4'10\"   Walk Backward 2lap  X \"   Walk Sideways 2lap  X \"          Lower Extremity Exercises:       Heel/Toe Raises 12*  x    Marches 12*  x    Squats 12*  x    3 Way Hip 12*  x    Hamstring Curls 12*  x    Lunges       Step-Ups              Lower Extremity Stretches:              Seated Exercises:              Upper Extremity Exercises:       Shoulder Flexion       Shoulder ABD/ADD       Shoulder Horizontal ABD/ADD       Shoulder IR/ER       Shoulder Circles       Shoulder Shrugs       Rows       Bicep Curls              Upper Extremity Stretches:              Balance:              Dynamic Gait:              Deep [place]:       Hang 5mins  x    Bicycle       Hip ABD/ADD 12*x  x    Hip Flex/Ext 12*x  x         Specific Interventions Next Treatment: alternate aquatics and land for neck and back ROM, strengthening, pain management, manual and modalities as needed    Activity/Treatment Tolerance:  [x]  Patient tolerated treatment well  []  Patient limited by fatigue  []  Patient limited by pain   []  Patient limited by medical complications  []  Other:     Assessment: Progressed therex with increased reps as shown above. Pt apprehensive with movement d/t current pain level, however improving within session. Vcs to stay within pain free range needed. Pt notes pressure relief in middle of LB while in pool.  Will continue to progress as tolerated by pt per POC. Goals    Patient Goal:Be painless, do daily activity without pain    Short Term Goals: 4 weeks  Patient will improve AROM of cervical rotation to 35 degrees for scanning environment. Patient will demonstrate good core engagement and postural awareness during therapy session with <3 cues to decrease spinal strain when lifting and cooking. Patient will have <50% lumbar and hip AROM restriction for ease completing ADLs. Long Term Goals: 8 weeks  Patient will demonstrate good body mechanics with lifting and cleaning to decrease spinal stress. Patient will tolerate standing >10 minutes walking >30 minutes at grocery store with < 4/10 pain. Patient will get >4 hours of sleep with medication with less pain in neck and back upon waking. Patient will be independent and compliant with HEP to achieve above goals. Patient Education:   []  HEP/Education Completed: Plan of Care, Goals, attendance policy, pool guidelines and location  [employer] Access Code:  [x]  No new Education completed  []  Reviewed Prior HEP      []  Patient verbalized and/or demonstrated understanding of education provided. []  Patient unable to verbalize and/or demonstrate understanding of education provided. Will continue education. []  Barriers to learning:     PLAN:  Treatment Recommendations: Strengthening, Range of Motion, Manual Therapy - Soft Tissue Mobilization, Pain Management, Home Exercise Program, Patient Education, Integrative Dry Needling, Aquatics, and Modalities    []  Plan of care initiated. Plan to see patient 2 times per week for 8 weeks to address the treatment planned outlined above.   [x]  Continue with current plan of care  []  Modify plan of care as follows:    []  Hold pending physician visit  []  Discharge    Time In 1223   Time Out 1302   Timed Code Minutes: 40   Total Treatment Time: 40     Electronically Signed by: Brett Benitez PTA

## 2022-10-07 ENCOUNTER — HOSPITAL ENCOUNTER (OUTPATIENT)
Dept: PHYSICAL THERAPY | Age: 52
Setting detail: THERAPIES SERIES
Discharge: HOME | End: 2022-10-07
Payer: COMMERCIAL

## 2022-10-07 PROCEDURE — 97110 THERAPEUTIC EXERCISES: CPT

## 2022-10-07 NOTE — PROGRESS NOTES
[address] [facility]  PHYSICAL THERAPY  [] EVALUATION  [x] DAILY NOTE (LAND) [] DAILY NOTE (AQUATIC ) [] PROGRESS NOTE [] DISCHARGE NOTE    [x] OUTPATIENT REHABILITATION CENTER [facility]   [] [place] [address]    [] [facility]   [] Zane Lester    Date: 10/7/2022  Patient Name:  Toya Marques  : 1970  MRN: [id number]  CSN: [id number]    Referring Practitioner Ar Cleaning MD   Diagnosis Other intervertebral disc degeneration, lumbosacral region [M51.37]  Spondylosis without myelopathy or radiculopathy, lumbosacral region [M47.817]  Sciatica, right side [M54.31]    Treatment Diagnosis Chronic neck and back pain, decreased cervical, thoracic, lumbar and hip ROM, core weakness   Date of Evaluation 22    Additional Pertinent History HTN, stroke. Functional Outcome Measure Used Modified Oswestry   Functional Outcome Score 26/50=52% (22)       Insurance: Primary: Payor: [employer] DESTINY /  /  / ,   Secondary:    Authorization Information: INSURANCE THERAPY BENEFIT: No precert until after 88RY visit. Visit Limit Based on Precert  AQUATIC THERAPY COVERED:   Yes  MODALITIES COVERED:  Yes except for Iontophoresis and Hot/Cold Packs. TELEHEALTH COVERED: No   Visit # 4, 4/10 for progress note   Visits Allowed:    Recertification Date:    Physician Follow-Up: Upon completion of PT; Epidural 10/15/22   Physician Orders:    History of Present Illness: Pt presents with chronic lumbar pain that has been progressively worsening for past 3 years. Pt began having trouble doing his job duties. Pain affects sleep. Pain affects position tolerance. Lifting, return to stand from bending over, standing with prolonged sitting cause pain. Right leg pain throbbing occurs during the night. Pt has pain in neck/upper back as well. XR/MRI showed disc slipping which may be causing pinched nerve in back.  Pt taking prescribed Lyrica and Naproxen for pain control. Pt notes heat helps. SUBJECTIVE: Pt reports felt better after previous session. Notes pain is located mid to low back and rates it 6/10. TREATMENT   Precautions:    Pain: 6/10 neck and low back    \"X in shaded column indicates activity completed today    *\" next to exercise/intervention indicates progression   Modalities Parameters/  Location  Notes   [facility] Mid/low back X Concurrent with exercises                Manual Therapy Time/Technique  Notes                     Exercise/Intervention   Notes   Educated on pool benefits, guidelines and location              Prone lying 2 min   Increased low back pain          Mat table: 90/90 decompression position (chair under legs)*       Piriformis stretch: IR/ER* x3 20 sec x Restrictions present   HS stretch w/ towel * x3 20 sec x Restrictions present, aggravated B UE                  Elbow press into mat table* x5 5 sec x Slight pain increase   Cervical AROM* X5 ea  x           Seated:       Thoracic extension* x10 2 sec x Improved pain level   Lateral flexion stretch* x10 2 sec x    Cervical retraction* x5 5 sec x             Specific Interventions Next Treatment: alternate aquatics and land for neck and back ROM, strengthening, pain management, manual and modalities as needed    Activity/Treatment Tolerance:  []  Patient tolerated treatment well  []  Patient limited by fatigue  [x]  Patient limited by pain   []  Patient limited by medical complications  []  Other:     Assessment: Initiated land therex as shown above per flow sheet. Pt noted \"the heat is helping me stay relaxed with this today\". Pt demo painful tendencies with basic bed mobility and exercise completion. Vcs to stay within a pain free range needed. Therapist placed chair underneath B LE, 90/90 decompression position, pt noted relief. Pt demo slow pace with exercise completion. Pt requested to sit towards end of treatment session d/t pain.  Will continue to progress as tolerated by pt per POC.     Body Structures/Functions/Activity Limitations: impaired ROM, impaired strength, pain, and abnormal posture  Prognosis: good      Goals    Patient Goal:Be painless, do daily activity without pain    Short Term Goals: 4 weeks  Patient will improve AROM of cervical rotation to 35 degrees for scanning environment. Patient will demonstrate good core engagement and postural awareness during therapy session with <3 cues to decrease spinal strain when lifting and cooking. Patient will have <50% lumbar and hip AROM restriction for ease completing ADLs. Long Term Goals: 8 weeks  Patient will demonstrate good body mechanics with lifting and cleaning to decrease spinal stress. Patient will tolerate standing >10 minutes walking >30 minutes at grocery store with < 4/10 pain. Patient will get >4 hours of sleep with medication with less pain in neck and back upon waking. Patient will be independent and compliant with HEP to achieve above goals. Patient Education:   []  HEP/Education Completed: Plan of Care, Goals, attendance policy, pool guidelines and location  [employer] Access Code:  []  No new Education completed  [x]  Reviewed Prior HEP      [x]  Patient verbalized and/or demonstrated understanding of education provided. []  Patient unable to verbalize and/or demonstrate understanding of education provided. Will continue education. []  Barriers to learning:     PLAN:  Treatment Recommendations: Strengthening, Range of Motion, Manual Therapy - Soft Tissue Mobilization, Pain Management, Home Exercise Program, Patient Education, Integrative Dry Needling, Aquatics, and Modalities    []  Plan of care initiated. Plan to see patient 2 times per week for 8 weeks to address the treatment planned outlined above.   [x]  Continue with current plan of care  []  Modify plan of care as follows:    []  Hold pending physician visit  []  Discharge    Time In 1245   Time Out 1323   Timed Code Minutes: 38   Total Treatment Time: 45     Electronically Signed by: Shaila Preciado PTA

## 2022-10-08 DIAGNOSIS — I10 ESSENTIAL HYPERTENSION: ICD-10-CM

## 2022-10-10 RX ORDER — AMLODIPINE BESYLATE 5 MG/1
TABLET ORAL
Qty: 90 TABLET | Refills: 1 | Status: SHIPPED | OUTPATIENT
Start: 2022-10-10

## 2022-10-10 NOTE — TELEPHONE ENCOUNTER
Recent Visits  Date Type Provider Dept   05/05/22 Office Visit Ann Alford, BUSHRA - CNP [facility]   04/21/22 Office Visit Ann Alford, APRN - CNP [facility]   03/07/22 Office Visit Ann Alford, BUSHRA - CNP [facility]   01/20/22 Office Visit Ann Alford, BUSHRA - CNP [facility]   10/12/21 Office Visit Ann Alford, BUSHRA - CNP [facility]   Showing recent visits within past 540 days with a meds authorizing provider and meeting all other requirements  Future Appointments  Date Type Provider Dept   10/24/22 Appointment BUSHRA Whaley CNP [facility]   Showing future appointments within next 150 days with a meds authorizing provider and meeting all other requirements      Future Appointments   Date Time Provider Jennifer aRmirez   10/11/2022  1:00 PM Chen Simon PTA [facility]   10/14/2022 12:45 PM Chen Simon PTA [facility] [place] [facility]   10/18/2022  1:00 PM Jojo Martinez, PT [facility] [place] [facility]   10/21/2022 10:30 AM Jojo Martinez, PT [facility] [place] [facility]   10/24/2022  4:00 PM BUSHRA Whaley CNP [facility]   11/15/2022  9:00 AM BUSHRA Dobson Cea CNP N [facility]

## 2022-10-11 ENCOUNTER — HOSPITAL ENCOUNTER (OUTPATIENT)
Dept: PHYSICAL THERAPY | Age: 52
Setting detail: THERAPIES SERIES
Discharge: HOME | End: 2022-10-11
Payer: COMMERCIAL

## 2022-10-11 PROCEDURE — 97113 AQUATIC THERAPY/EXERCISES: CPT

## 2022-10-11 NOTE — PROGRESS NOTES
[address] [facility]  PHYSICAL THERAPY  [] EVALUATION  [] DAILY NOTE (LAND) [x] DAILY NOTE (AQUATIC ) [] PROGRESS NOTE [] DISCHARGE NOTE    [x] OUTPATIENT REHABILITATION CENTER [facility]   [] [place] [address]    [] [facility]   [] Marianne Desai    Date: 10/11/2022  Patient Name:  Milagros Maya  : 1970  MRN: [id number]  CSN: [id number]    Referring Practitioner Shagufta Burk MD   Diagnosis Other intervertebral disc degeneration, lumbosacral region [M51.37]  Spondylosis without myelopathy or radiculopathy, lumbosacral region [M47.817]  Sciatica, right side [M54.31]    Treatment Diagnosis Chronic neck and back pain, decreased cervical, thoracic, lumbar and hip ROM, core weakness   Date of Evaluation 22    Additional Pertinent History HTN, stroke. Functional Outcome Measure Used Modified Oswestry   Functional Outcome Score 26/50=52% (22)       Insurance: Primary: Payor: [employer] DESTINY /  /  / ,   Secondary:    Authorization Information: INSURANCE THERAPY BENEFIT: No precert until after 73VI visit. Visit Limit Based on Precert  AQUATIC THERAPY COVERED:   Yes  MODALITIES COVERED:  Yes except for Iontophoresis and Hot/Cold Packs. TELEHEALTH COVERED: No   Visit # 5, 5/10 for progress note   Visits Allowed: Pre-cert: AQUATICS ONLY AFTER EVAL AND 12 VISITS HAVE BEEN USED. MAX 20 VISITS PT/OT/ST/PULMONARY PER CALENDAR YEAR, HARD LIMIT    Recertification Date:    Physician Follow-Up: Upon completion of PT; Epidural 10/15/22   Physician Orders:    History of Present Illness: Pt presents with chronic lumbar pain that has been progressively worsening for past 3 years. Pt began having trouble doing his job duties. Pain affects sleep. Pain affects position tolerance. Lifting, return to stand from bending over, standing with prolonged sitting cause pain. Right leg pain throbbing occurs during the night.  Pt has pain in neck/upper back as well. XR/MRI showed disc slipping which may be causing pinched nerve in back. Pt taking prescribed Lyrica and Naproxen for pain control. Pt notes heat helps. SUBJECTIVE: Pt states 5/10 pain. Reports feeling good after land appt and denied increase in pain after session. AQUATICS TREATMENT   Precautions:    Pain: 5/10 middle LB    X in shaded column indicates activity completed today   Exercise/Intervention Sets/Sec  Notes   Walk Forward 2lap  X 4'10\"   Walk Backward 2lap  X \"   Walk Sideways 2lap  X \"          Lower Extremity Exercises:       Heel/Toe Raises 15*  x    Marches 15*  x    Squats 15*  x    3 Way Hip 15*  x    Hamstring Curls 15*  x    Lunges       Step-Ups              Lower Extremity Stretches:       HS stretch* x3 15 sec x    Seated Exercises:              Upper Extremity Exercises:       Shoulder Flexion       Shoulder ABD/ADD       Shoulder Horizontal ABD/ADD       Shoulder IR/ER       Shoulder Circles       Shoulder Shrugs       Rows       Bicep Curls              Upper Extremity Stretches:              Balance:              Dynamic Gait:              Deep Water:       Hang 5mins  x    Bicycle* 2 min  x    Hip ABD/ADD 1 min*  x    Hip Flex/Ext 1 min*  x         Specific Interventions Next Treatment: alternate aquatics and land for neck and back ROM, strengthening, pain management, manual and modalities as needed    Activity/Treatment Tolerance:  [x]  Patient tolerated treatment well  []  Patient limited by fatigue  []  Patient limited by pain   []  Patient limited by medical complications  []  Other:     Assessment: Continued with therex and increased reps as tolerated. Pt handled treatment well compared to previous aquatic session. No reports of increase pain during session. Will continue to progress as tolerated by pt.        Goals    Patient Goal:Be painless, do daily activity without pain    Short Term Goals: 4 weeks  Patient will improve AROM of cervical rotation to 35 degrees for scanning environment. Patient will demonstrate good core engagement and postural awareness during therapy session with <3 cues to decrease spinal strain when lifting and cooking. Patient will have <50% lumbar and hip AROM restriction for ease completing ADLs. Long Term Goals: 8 weeks  Patient will demonstrate good body mechanics with lifting and cleaning to decrease spinal stress. Patient will tolerate standing >10 minutes walking >30 minutes at grocery store with < 4/10 pain. Patient will get >4 hours of sleep with medication with less pain in neck and back upon waking. Patient will be independent and compliant with HEP to achieve above goals. Patient Education:   []  HEP/Education Completed: Plan of Care, Goals, attendance policy, pool guidelines and location  [employer] Access Code:  [x]  No new Education completed  []  Reviewed Prior HEP      []  Patient verbalized and/or demonstrated understanding of education provided. []  Patient unable to verbalize and/or demonstrate understanding of education provided. Will continue education. []  Barriers to learning:     PLAN:  Treatment Recommendations: Strengthening, Range of Motion, Manual Therapy - Soft Tissue Mobilization, Pain Management, Home Exercise Program, Patient Education, Integrative Dry Needling, Aquatics, and Modalities    []  Plan of care initiated. Plan to see patient 2 times per week for 8 weeks to address the treatment planned outlined above.   [x]  Continue with current plan of care  []  Modify plan of care as follows:    []  Hold pending physician visit  []  Discharge    Time In 1258   Time Out 1338   Timed Code Minutes: 40   Total Treatment Time: 40     Electronically Signed by: Damion Posey PTA

## 2022-10-14 ENCOUNTER — HOSPITAL ENCOUNTER (OUTPATIENT)
Dept: PHYSICAL THERAPY | Age: 52
Setting detail: THERAPIES SERIES
Discharge: HOME | End: 2022-10-14
Payer: COMMERCIAL

## 2022-10-14 PROCEDURE — 97110 THERAPEUTIC EXERCISES: CPT

## 2022-10-14 NOTE — PROGRESS NOTES
[address] [facility]  PHYSICAL THERAPY  [] EVALUATION  [x] DAILY NOTE (LAND) [] DAILY NOTE (AQUATIC ) [] PROGRESS NOTE [] DISCHARGE NOTE    [x] OUTPATIENT REHABILITATION CENTER [facility]   [] [place] [address]    [] [facility]   [] Miranda Centenoon    Date: 10/14/2022  Patient Name:  Nemo Vaca  : 1970  MRN: [id number]  CSN: [id number]    Referring Practitioner Darlyn Grace MD   Diagnosis Other intervertebral disc degeneration, lumbosacral region [M51.37]  Spondylosis without myelopathy or radiculopathy, lumbosacral region [M47.817]  Sciatica, right side [M54.31]    Treatment Diagnosis Chronic neck and back pain, decreased cervical, thoracic, lumbar and hip ROM, core weakness   Date of Evaluation 22    Additional Pertinent History HTN, stroke. Functional Outcome Measure Used Modified Oswestry   Functional Outcome Score 26/50=52% (22)       Insurance: Primary: Payor: [employer] DESTINY /  /  / ,   Secondary:    Authorization Information: INSURANCE THERAPY BENEFIT: No precert until after 19RX visit. Visit Limit Based on Precert  AQUATIC THERAPY COVERED:   Yes  MODALITIES COVERED:  Yes except for Iontophoresis and Hot/Cold Packs. TELEHEALTH COVERED: No   Visit # 6, 6/10 for progress note   Visits Allowed: 30   Recertification Date: 42   Physician Follow-Up: Upon completion of PT; Epidural 10/15/22   Physician Orders:    History of Present Illness: Pt presents with chronic lumbar pain that has been progressively worsening for past 3 years. Pt began having trouble doing his job duties. Pain affects sleep. Pain affects position tolerance. Lifting, return to stand from bending over, standing with prolonged sitting cause pain. Right leg pain throbbing occurs during the night. Pt has pain in neck/upper back as well. XR/MRI showed disc slipping which may be causing pinched nerve in back.  Pt taking prescribed Lyrica and Naproxen for pain control. Pt notes heat helps. SUBJECTIVE: Pt states feeling okay this date. Previous session went well. Notes 6/10 pain. TREATMENT   Precautions:    Pain: 6/10 neck and low back    \"X in shaded column indicates activity completed today    *\" next to exercise/intervention indicates progression   Modalities Parameters/  Location  Notes   MHP Mid/low back X Concurrent with exercises                Manual Therapy Time/Technique  Notes                     Exercise/Intervention   Notes   Educated on pool benefits, guidelines and location              Prone lying 2 min   Increased low back pain          Mat table: 90/90 decompression position (chair under legs)    No chair this date*   Piriformis stretch: IR/ER x3 20 sec x Restrictions present   HS stretch w/ towel  x3 20 sec  Restrictions present, aggravated B UE    SKTC* x3 20 sec x           Elbow press into mat table x5 5 sec  Slight pain increase   Cervical AROM X5 ea             Seated:       Thoracic extension x10 2 sec x Improved pain level   Lateral flexion stretch x10 2 sec x    Cervical retraction x5 5 sec x    Swiss ball roll outs: fwd, lateral* x5 5 sec x Vcs to stay within pain free range                           Specific Interventions Next Treatment: alternate aquatics and land for neck and back ROM, strengthening, pain management, manual and modalities as needed    Activity/Treatment Tolerance:  []  Patient tolerated treatment well  []  Patient limited by fatigue  [x]  Patient limited by pain   []  Patient limited by medical complications  []  Other:     Assessment: Continued therex as shown above per flow sheet. Pt continued to tolerated treatment poorly d/t pain increase. Pt unable to tolerate supine exercises, therapist discontinued mid way through appt and switched to seated. Pt tolerated seated exercises fairly, continued to demo apprehension with movement. Will continue to progress as tolerated by pt.      Body Structures/Functions/Activity Limitations: impaired ROM, impaired strength, pain, and abnormal posture  Prognosis: good      Goals    Patient Goal:Be painless, do daily activity without pain    Short Term Goals: 4 weeks  Patient will improve AROM of cervical rotation to 35 degrees for scanning environment. Patient will demonstrate good core engagement and postural awareness during therapy session with <3 cues to decrease spinal strain when lifting and cooking. Patient will have <50% lumbar and hip AROM restriction for ease completing ADLs. Long Term Goals: 8 weeks  Patient will demonstrate good body mechanics with lifting and cleaning to decrease spinal stress. Patient will tolerate standing >10 minutes walking >30 minutes at grocery store with < 4/10 pain. Patient will get >4 hours of sleep with medication with less pain in neck and back upon waking. Patient will be independent and compliant with HEP to achieve above goals. Patient Education:   []  HEP/Education Completed: Plan of Care, Goals, attendance policy, pool guidelines and location  [facility] Access Code:  []  No new Education completed  [x]  Reviewed Prior HEP      [x]  Patient verbalized and/or demonstrated understanding of education provided. []  Patient unable to verbalize and/or demonstrate understanding of education provided. Will continue education. []  Barriers to learning:     PLAN:  Treatment Recommendations: Strengthening, Range of Motion, Manual Therapy - Soft Tissue Mobilization, Pain Management, Home Exercise Program, Patient Education, Integrative Dry Needling, Aquatics, and Modalities    []  Plan of care initiated. Plan to see patient 2 times per week for 8 weeks to address the treatment planned outlined above.   [x]  Continue with current plan of care  []  Modify plan of care as follows:    []  Hold pending physician visit  []  Discharge    Time In 1252   Time Out 1330   Timed Code Minutes: 38   Total Treatment Time: 38     Electronically Signed by: Elizabeth Luo, PTA

## 2022-10-18 ENCOUNTER — HOSPITAL ENCOUNTER (OUTPATIENT)
Dept: PHYSICAL THERAPY | Age: 52
Setting detail: THERAPIES SERIES
Discharge: HOME | End: 2022-10-18
Payer: COMMERCIAL

## 2022-10-18 PROCEDURE — 97110 THERAPEUTIC EXERCISES: CPT

## 2022-10-18 NOTE — PROGRESS NOTES
[address] [facility]  PHYSICAL THERAPY  [] EVALUATION  [x] DAILY NOTE (LAND) [] DAILY NOTE (AQUATIC ) [] PROGRESS NOTE [] DISCHARGE NOTE    [x] OUTPATIENT REHABILITATION CENTER [facility]   [] [place] [address]    [] [facility]   [] Shar Vicente    Date: 10/18/2022  Patient Name:  Kapil Kwon  : 1970  MRN: [id number]  CSN: [id number]    Referring Practitioner Sadie Buckley MD   Diagnosis Other intervertebral disc degeneration, lumbosacral region [M51.37]  Spondylosis without myelopathy or radiculopathy, lumbosacral region [M47.817]  Sciatica, right side [M54.31]    Treatment Diagnosis Chronic neck and back pain, decreased cervical, thoracic, lumbar and hip ROM, core weakness   Date of Evaluation 22    Additional Pertinent History HTN, stroke. Functional Outcome Measure Used Modified Oswestry   Functional Outcome Score 26/50=52% (22)       Insurance: Primary: Payor: [email or web address] DESTINY /  /  / ,   Secondary:    Authorization Information: INSURANCE THERAPY BENEFIT: No precert until after 40UU visit. Visit Limit Based on Precert  AQUATIC THERAPY COVERED:   Yes  MODALITIES COVERED:  Yes except for Iontophoresis and Hot/Cold Packs. TELEHEALTH COVERED: No   Visit # 7, 7/10 for progress note   Visits Allowed: 30   Recertification Date:    Physician Follow-Up: Upon completion of PT; Epidural 10/31/22   Physician Orders:    History of Present Illness: Pt presents with chronic lumbar pain that has been progressively worsening for past 3 years. Pt began having trouble doing his job duties. Pain affects sleep. Pain affects position tolerance. Lifting, return to stand from bending over, standing with prolonged sitting cause pain. Right leg pain throbbing occurs during the night. Pt has pain in neck/upper back as well. XR/MRI showed disc slipping which may be causing pinched nerve in back.  Pt taking prescribed Lyrica and Naproxen for pain control. Pt notes heat helps. SUBJECTIVE: Pt states pain in the R side of the neck and the mid back today. Reports 8/10 pain today. States that he had difficulty sleeping last night due to pain waking him up. He states that he is pretty comfortable laying on the L side more than any other position. Patient reports shooting pain down the back of the R leg is getting worse, he states that it usually occurs at night or when flat on his back. He states that he did have a fall last Friday, he reports that it felt like his R leg gave out on him and he missed the bottom 2 steps when doing laundry and fell onto the wall at the bottom with his R shoulder. He states that his shoulder is starting to feel better now.      TREATMENT   Precautions:    Pain: 6/10 neck and low back    \"X in shaded column indicates activity completed today    *\" next to exercise/intervention indicates progression   Modalities Parameters/  Location  Notes   MHP Mid/low back X Concurrent with exercises                Manual Therapy Time/Technique  Notes                     Exercise/Intervention   Notes   Educated on pool benefits, guidelines and location              Prone lying 2 min   Increased low back pain          Mat table: 90/90 decompression position (chair under legs)    No chair this date*   Piriformis stretch: IR/ER x3 20 sec x    HS stretch w/ towel  x3 20 sec  Restrictions present, aggravated B UE    SKTC x3 20 sec x    LTR* 5x  x Vcs to go slowly and stay within pain free range                 Elbow press into mat table x5 5 sec  Slight pain increase   Cervical AROM X5 ea             Seated:       Thoracic extension x10 5 sec* x Improved pain level   Lateral flexion stretch x10 2 sec x    Cervical retraction x5 5 sec     Swiss ball roll outs: fwd, lateral X10* 10 sec* x Vcs to stay within pain free range; reports catch and release sensation with return to upright    Cervical AROM x5 5 sec x Vcs to stay in pain free range; reports increased RUE radicular symptoms with rotation to the R                   Specific Interventions Next Treatment: alternate aquatics and land for neck and back ROM, strengthening, pain management, manual and modalities as needed    Activity/Treatment Tolerance:  []  Patient tolerated treatment well  []  Patient limited by fatigue  [x]  Patient limited by pain   []  Patient limited by medical complications  []  Other:     Assessment: Continued therex as shown above per flow sheet. Pt tolerated supine therex for about 15 mins then needed to sit up. Performed seated therex with continued complaints of thoracic/cervical pain at end of session. Performed seated cervical AROM with complaints of RUE radicular symptoms with rotation to the R.    Body Structures/Functions/Activity Limitations: impaired ROM, impaired strength, pain, and abnormal posture  Prognosis: good      Goals    Patient Goal:Be painless, do daily activity without pain    Short Term Goals: 4 weeks  Patient will improve AROM of cervical rotation to 35 degrees for scanning environment. Patient will demonstrate good core engagement and postural awareness during therapy session with <3 cues to decrease spinal strain when lifting and cooking. Patient will have <50% lumbar and hip AROM restriction for ease completing ADLs. Long Term Goals: 8 weeks  Patient will demonstrate good body mechanics with lifting and cleaning to decrease spinal stress. Patient will tolerate standing >10 minutes walking >30 minutes at grocery store with < 4/10 pain. Patient will get >4 hours of sleep with medication with less pain in neck and back upon waking. Patient will be independent and compliant with HEP to achieve above goals.        Patient Education:   []  HEP/Education Completed: Plan of Care, Goals, attendance policy, pool guidelines and location  [employer] Access Code:  []  No new Education completed  [x]  Reviewed Prior HEP      [x]  Patient verbalized and/or demonstrated understanding of education provided. []  Patient unable to verbalize and/or demonstrate understanding of education provided. Will continue education. []  Barriers to learning:     PLAN:  Treatment Recommendations: Strengthening, Range of Motion, Manual Therapy - Soft Tissue Mobilization, Pain Management, Home Exercise Program, Patient Education, Integrative Dry Needling, Aquatics, and Modalities    []  Plan of care initiated. Plan to see patient 2 times per week for 8 weeks to address the treatment planned outlined above.   [x]  Continue with current plan of care  []  Modify plan of care as follows:    []  Hold pending physician visit  []  Discharge    Time In 1257   Time Out 1343   Timed Code Minutes: 45   Total Treatment Time: 45     Electronically Signed by: Juan Manuel Briones PT

## 2022-10-21 ENCOUNTER — HOSPITAL ENCOUNTER (OUTPATIENT)
Dept: PHYSICAL THERAPY | Age: 52
Setting detail: THERAPIES SERIES
Discharge: HOME | End: 2022-10-21
Payer: COMMERCIAL

## 2022-10-21 PROCEDURE — 97113 AQUATIC THERAPY/EXERCISES: CPT

## 2022-10-21 NOTE — PROGRESS NOTES
[address] [facility]  PHYSICAL THERAPY  [] EVALUATION  [] DAILY NOTE (LAND) [x] DAILY NOTE (AQUATIC ) [] PROGRESS NOTE [] DISCHARGE NOTE    [x] OUTPATIENT REHABILITATION CENTER [facility]   [] [place] [address]    [] [facility]   [] Oksana Conner    Date: 10/21/2022  Patient Name:  Codi Gusman  : 1970  MRN: [id number]  CSN: [id number]    Referring Practitioner Antonina Nuñez MD   Diagnosis Other intervertebral disc degeneration, lumbosacral region [M51.37]  Spondylosis without myelopathy or radiculopathy, lumbosacral region [M47.817]  Sciatica, right side [M54.31]    Treatment Diagnosis Chronic neck and back pain, decreased cervical, thoracic, lumbar and hip ROM, core weakness   Date of Evaluation 22    Additional Pertinent History HTN, stroke. Functional Outcome Measure Used Modified Oswestry   Functional Outcome Score 26/50=52% (22)       Insurance: Primary: Payor: [employer]ST[employer]Y /  /  / ,   Secondary:    Authorization Information: INSURANCE THERAPY BENEFIT: No precert until after 63GJ visit. Visit Limit Based on Precert  AQUATIC THERAPY COVERED:   Yes  MODALITIES COVERED:  Yes except for Iontophoresis and Hot/Cold Packs. TELEHEALTH COVERED: No   Visit # 8, 8/10 for progress note   Visits Allowed: Pre-cert: AQUATICS ONLY AFTER EVAL AND 12 VISITS HAVE BEEN USED. MAX 20 VISITS PT/OT/ST/PULMONARY PER CALENDAR YEAR, HARD LIMIT    Recertification Date:    Physician Follow-Up: Upon completion of PT; Epidural 10/31/22   Physician Orders:    History of Present Illness: Pt presents with chronic lumbar pain that has been progressively worsening for past 3 years. Pt began having trouble doing his job duties. Pain affects sleep. Pain affects position tolerance. Lifting, return to stand from bending over, standing with prolonged sitting cause pain. Right leg pain throbbing occurs during the night.  Pt has pain in neck/upper back as well. XR/MRI showed disc slipping which may be causing pinched nerve in back. Pt taking prescribed Lyrica and Naproxen for pain control. Pt notes heat helps. SUBJECTIVE: Pt reports he had a bad night last night. He couldn't get comfortable as the back just felt really tight. AQUATICS TREATMENT   Precautions:    Pain: 7/10 middle LB    X in shaded column indicates activity completed today   Exercise/Intervention Sets/Sec  Notes   Walk Forward 3 laps  X Chair to 4'   Walk Backward 3 laps  X \"   Walk Sideways 3 laps  X \"          Lower Extremity Exercises:       Heel/Toe Raises 15  x    Marches 15  x    Squats 15  x    3 Way Hip 15  x    Hamstring Curls 15  x    Lunges       Step-Ups              Lower Extremity Stretches:       HS stretch x3 15 sec x    Seated Exercises:              Upper Extremity Exercises:       Shoulder Flexion       Shoulder ABD/ADD       Shoulder Horizontal ABD/ADD       Shoulder IR/ER       Shoulder Circles       Shoulder Shrugs       Rows       Bicep Curls              Upper Extremity Stretches:              Balance:              Dynamic Gait:              Deep Water:    2 noodles, 8'   Hang 5mins  x    Bicycle 2 min  x    Hip ABD/ADD 1 min  x    Hip Flex/Ext 1 min  x         Specific Interventions Next Treatment: alternate aquatics and land for neck and back ROM, strengthening, pain management, manual and modalities as needed    Activity/Treatment Tolerance:  [x]  Patient tolerated treatment well  []  Patient limited by fatigue  []  Patient limited by pain   []  Patient limited by medical complications  []  Other:     Assessment: No progressions this date d/t elevated pain prior to therapy. Pt notes the pool stretches him out. Pain rated 5/10 while hanging and felt pressure in back upon exiting pool. Pt required cues to relax arms while walking and core engagement emphasized during session.        Goals    Patient Goal:Be painless, do daily activity without pain    Short Term Goals: 4 weeks  Patient will improve AROM of cervical rotation to 35 degrees for scanning environment. Patient will demonstrate good core engagement and postural awareness during therapy session with <3 cues to decrease spinal strain when lifting and cooking. Patient will have <50% lumbar and hip AROM restriction for ease completing ADLs. Long Term Goals: 8 weeks  Patient will demonstrate good body mechanics with lifting and cleaning to decrease spinal stress. Patient will tolerate standing >10 minutes walking >30 minutes at grocery store with < 4/10 pain. Patient will get >4 hours of sleep with medication with less pain in neck and back upon waking. Patient will be independent and compliant with HEP to achieve above goals. Patient Education:   []  HEP/Education Completed: Plan of Care, Goals, attendance policy, pool guidelines and location  [employer] [place] Access Code:  [x]  No new Education completed  []  Reviewed Prior HEP      []  Patient verbalized and/or demonstrated understanding of education provided. []  Patient unable to verbalize and/or demonstrate understanding of education provided. Will continue education. []  Barriers to learning:     PLAN:  Treatment Recommendations: Strengthening, Range of Motion, Manual Therapy - Soft Tissue Mobilization, Pain Management, Home Exercise Program, Patient Education, Integrative Dry Needling, Aquatics, and Modalities    []  Plan of care initiated. Plan to see patient 2 times per week for 8 weeks to address the treatment planned outlined above.   [x]  Continue with current plan of care  []  Modify plan of care as follows:    []  Hold pending physician visit  []  Discharge    Time In 1035   Time Out 1118   Timed Code Minutes: 43   Total Treatment Time: 43     Electronically Signed by: Az Guillory PT

## 2022-10-25 ENCOUNTER — HOSPITAL ENCOUNTER (OUTPATIENT)
Dept: PHYSICAL THERAPY | Age: 52
Setting detail: THERAPIES SERIES
Discharge: HOME | End: 2022-10-25
Payer: COMMERCIAL

## 2022-10-25 PROCEDURE — 97113 AQUATIC THERAPY/EXERCISES: CPT

## 2022-10-25 NOTE — PROGRESS NOTES
[address] [facility]  PHYSICAL THERAPY  [] EVALUATION  [] DAILY NOTE (LAND) [x] DAILY NOTE (AQUATIC ) [] PROGRESS NOTE [] DISCHARGE NOTE    [x] [facility] [place]   [] [place] [address]    [] [facility]   [] Forrest Guido    Date: 10/25/2022  Patient Name:  Joan Bautista  : 1970  MRN: [id number]  CSN: [id number]    Referring Practitioner Lasha Butcher MD   Diagnosis Other intervertebral disc degeneration, lumbosacral region [M51.37]  Spondylosis without myelopathy or radiculopathy, lumbosacral region [M47.817]  Sciatica, right side [M54.31]    Treatment Diagnosis Chronic neck and back pain, decreased cervical, thoracic, lumbar and hip ROM, core weakness   Date of Evaluation 22    Additional Pertinent History HTN, stroke. Functional Outcome Measure Used Modified Oswestry   Functional Outcome Score 26/50=52% (22)       Insurance: Primary: Payor: [employer]ouches DESTINY /  /  / ,   Secondary:    Authorization Information: INSURANCE THERAPY BENEFIT: No precert until after 81UQ visit. Visit Limit Based on Precert  AQUATIC THERAPY COVERED:   Yes  MODALITIES COVERED:  Yes except for Iontophoresis and Hot/Cold Packs. TELEHEALTH COVERED: No   Visit # 9, 10 for progress note   Visits Allowed: Pre-cert: AQUATICS ONLY AFTER EVAL AND 12 VISITS HAVE BEEN USED. MAX 20 VISITS PT/OT/ST/PULMONARY PER CALENDAR YEAR, HARD LIMIT    Recertification Date:    Physician Follow-Up: Upon completion of PT; Epidural 10/31/22   Physician Orders:    History of Present Illness: Pt presents with chronic lumbar pain that has been progressively worsening for past 3 years. Pt began having trouble doing his job duties. Pain affects sleep. Pain affects position tolerance. Lifting, return to stand from bending over, standing with prolonged sitting cause pain. Right leg pain throbbing occurs during the night.  Pt has pain in neck/upper back as well. XR/MRI showed disc slipping which may be causing pinched nerve in back. Pt taking prescribed Lyrica and Naproxen for pain control. Pt notes heat helps. SUBJECTIVE: Pt reports he had a bad night last night. He couldn't get comfortable as the back just felt really tight. AQUATICS TREATMENT   Precautions:    Pain: 7/10 middle LB    X in shaded column indicates activity completed today   Exercise/Intervention Sets/Sec  Notes   Walk Forward 3 laps  X Chair to 4'   Walk Backward 3 laps  X Held due to pt reporting it is painful   Walk Sideways 3 laps  X \"          Lower Extremity Exercises:       Heel/Toe Raises 15  x    Marches 15  x    Squats 15  x    3 Way Hip 15  x    Hamstring Curls 15  x    Lunges       Step-Ups              Lower Extremity Stretches:       HS stretch x3 15 sec x    Seated Exercises:              Upper Extremity Exercises: In 4'10\"   Shoulder Flexion 10*  x    Shoulder ABD/ADD 10*  x    Shoulder Horizontal ABD/ADD 10*  x    Shoulder IR/ER       Shoulder Circles       Shoulder Shrugs       Rows 10*  x    Bicep Curls              Upper Extremity Stretches:              Balance:              Dynamic Gait:              Deep [place]:    2 noodles, 8'   Hang 5mins  x    Bicycle 3* min  x    Hip ABD/ADD 4 *min  x    Hip Flex/Ext 4* min  x         Specific Interventions Next Treatment: alternate aquatics and land for neck and back ROM, strengthening, pain management, manual and modalities as needed    Activity/Treatment Tolerance:  [x]  Patient tolerated treatment well  []  Patient limited by fatigue  []  Patient limited by pain   []  Patient limited by medical complications  []  Other:     Assessment: Progressed as noted above with patient reporting that he could feel it a little more in his back. Patient reporting pain level 6/10 at end of session and stating that pain pretty much stays around 5/10.       Goals    Patient Goal:Be painless, do daily activity without pain    Short Term Goals: 4 weeks  Patient will improve AROM of cervical rotation to 35 degrees for scanning environment. Patient will demonstrate good core engagement and postural awareness during therapy session with <3 cues to decrease spinal strain when lifting and cooking. Patient will have <50% lumbar and hip AROM restriction for ease completing ADL's. Long Term Goals: 8 weeks  Patient will demonstrate good body mechanics with lifting and cleaning to decrease spinal stress. Patient will tolerate standing >10 minutes walking >30 minutes at grocery store with < 4/10 pain. Patient will get >4 hours of sleep with medication with less pain in neck and back upon waking. Patient will be independent and compliant with HEP to achieve above goals. Patient Education:   []  HEP/Education Completed: Plan of Care, Goals, attendance policy, pool guidelines and location  [employer] Access Code:  [x]  No new Education completed  []  Reviewed Prior HEP      []  Patient verbalized and/or demonstrated understanding of education provided. []  Patient unable to verbalize and/or demonstrate understanding of education provided. Will continue education. []  Barriers to learning:     PLAN:  Treatment Recommendations: Strengthening, Range of Motion, Manual Therapy - Soft Tissue Mobilization, Pain Management, Home Exercise Program, Patient Education, Integrative Dry Needling, Aquatics, and Modalities    []  Plan of care initiated. Plan to see patient 2 times per week for 8 weeks to address the treatment planned outlined above.   [x]  Continue with current plan of care  []  Modify plan of care as follows:    []  Hold pending physician visit  []  Discharge    Time In 1357   Time Out 1442   Timed Code Minutes: 45   Total Treatment Time: 45     Electronically Signed by: Gayb Horn PTA

## 2022-10-26 ENCOUNTER — PREP FOR PROCEDURE (OUTPATIENT)
Dept: PHYSICAL MEDICINE AND REHAB | Age: 52
End: 2022-10-26

## 2022-10-28 ENCOUNTER — HOSPITAL ENCOUNTER (OUTPATIENT)
Dept: PHYSICAL THERAPY | Age: 52
Setting detail: THERAPIES SERIES
Discharge: HOME | End: 2022-10-28
Payer: COMMERCIAL

## 2022-10-28 PROCEDURE — 97110 THERAPEUTIC EXERCISES: CPT

## 2022-10-28 NOTE — PROGRESS NOTES
[facility]  [facility]  [] EVALUATION  [x] DAILY NOTE (LAND) [] DAILY NOTE (AQUATIC ) [] PROGRESS NOTE [] DISCHARGE NOTE    [x] OUTPATIENT REHABILITATION CENTER [facility]   [] [place] [address]    [] [facility]   [] Sreekanth Cruz    Date: 10/28/2022  Patient Name:  Soco Maldonado  : 1970  MRN: [id number]  CSN: [id number]    Referring Practitioner Carlos Bedolla MD   Diagnosis Other intervertebral disc degeneration, lumbosacral region [M51.37]  Spondylosis without myelopathy or radiculopathy, lumbosacral region [M47.817]  Sciatica, right side [M54.31]    Treatment Diagnosis Chronic neck and back pain, decreased cervical, thoracic, lumbar and hip ROM, core weakness   Date of Evaluation 22    Additional Pertinent History HTN, stroke. Functional Outcome Measure Used Modified Oswestry   Functional Outcome Score 26/50=52% (22) 23/50=46% (10/28/22)      Insurance: Primary: Payor: Alycia Parents /  /  / ,   Secondary:    Authorization Information: INSURANCE THERAPY BENEFIT: No precert until after 40KC visit. Visit Limit Based on Precert  AQUATIC THERAPY COVERED:   Yes  MODALITIES COVERED:  Yes except for Iontophoresis and Hot/Cold Packs. TELEHEALTH COVERED: No   Visit # 10, 1010 for progress note   Visits Allowed: 30   Recertification Date: 84   Physician Follow-Up: Alaina Dukes 11/15/22; Epidural 10/31/22   Physician Orders:    History of Present Illness: Pt presents with chronic lumbar pain that has been progressively worsening for past 3 years. Pt began having trouble doing his job duties. Pain affects sleep. Pain affects position tolerance. Lifting, return to stand from bending over, standing with prolonged sitting cause pain. Right leg pain throbbing occurs during the night. Pt has pain in neck/upper back as well. XR/MRI showed disc slipping which may be causing pinched nerve in back.  Pt taking prescribed Lyrica and Naproxen for pain control. Pt notes heat helps. SUBJECTIVE: Pt reports a little soreness between shoulder blades today. Pt notes recently right leg is tightening up and getting shooting pains at night, particularly when laying on right side. Pt notes pain is more intense after therapy sessions, especially land based. Pt notes he always has pain, usually at 5/10, so hard to tell if therapy is helping. TREATMENT   Precautions:    Pain: 6/10 between shoulder blades    \"X in shaded column indicates activity completed today    *\" next to exercise/intervention indicates progression   Modalities Parameters/  Location  Notes   MHP Mid/low back X Concurrent with exercises                Manual Therapy Time/Technique  Notes                     Exercise/Intervention   Notes   Educated on pool benefits, guidelines and location              Prone lying 2 min   Increased low back pain          Mat table: 90/90 decompression position (chair under legs)    No chair this date*   Piriformis stretch: IR/ER x3 20 sec x    HS stretch w/ towel  x3 20 sec  Restrictions present, aggravated B UE    SKTC x3 20 sec x    LTR 5x 5 sec x Vcs to go slowly and stay within pain free range   TA activation 10 5 sec x           Elbow press into mat table x5 5 sec  Slight pain increase          Seated:       Thoracic extension x4 5 sec x    Lateral flexion stretch x10 2 sec x    Cervical retraction x5 5 sec     Swiss ball roll outs: fwd, lateral X10 fwd  5 lat 10 sec x reports catch and release sensation with return to upright; more painful to right than left   Cervical AROM x10 5 sec x                    Specific Interventions Next Treatment: all aquatics    Activity/Treatment Tolerance:  []  Patient tolerated treatment well  []  Patient limited by fatigue  [x]  Patient limited by pain   []  Patient limited by medical complications  []  Other:     Assessment: Pt demos fair progress toward goals.  Pt continues to have restricted neck rotation, lumbar and hip ROM. Pt able to stand and walk longer durations since starting therapy. Oswestry has only decreased 3 points. Pt notes increased pain at end of therapy session today. Plan to continue with aquatic therapy 2x/wk and hold land therapy, until after epidural and follow up with pain management. Goals    Patient Goal:Be painless, do daily activity without pain    Short Term Goals: 4 weeks  Patient will improve AROM of cervical rotation to 35 degrees for scanning environment. GOAL NOT MET: neck rotation right 23 deg, left 25 deg. Continue Goal  Patient will demonstrate good core engagement and postural awareness during therapy session with <3 cues to decrease spinal strain when lifting and cooking. GOAL NOT MET: Pt continues to require cues for core engagement, and challenged with TA activation. Continue Goal  Patient will have <50% lumbar and hip AROM restriction for ease completing ADLs. GOAL NOT MET: Pt continues to have restriction in lumbar extension and lateral flexion and 50-75% restriction in hip. Continue Goal      Long Term Goals: 8 weeks  Patient will demonstrate good body mechanics with lifting and cleaning to decrease spinal stress. GOAL NOT MET:  .  Continue Goal   Patient will tolerate standing >10 minutes walking >30 minutes at grocery store with < 4/10 pain. GOAL MET:  Pt tolerates standing ~30 minutes, walking a mile before pain stops him. Discontinue Goal  Patient will get >4 hours of sleep with medication with less pain in neck and back upon waking. GOAL NOT MET: Pt continues to only get 2 hours of sleep before pain wakes him. Continue Goal  Patient will be independent and compliant with HEP to achieve above goals. GOAL NOT MET: Pt reports doing HEP on off days of therapy.   Continue Goal      Patient Education:   []  HEP/Education Completed: Plan of Care, Goals, attendance policy, pool guidelines and location  [employer] Access Code:  []  No new Education completed  [x]  Reviewed Prior HEP      [x]  Patient verbalized and/or demonstrated understanding of education provided. []  Patient unable to verbalize and/or demonstrate understanding of education provided. Will continue education. []  Barriers to learning:     PLAN:  Treatment Recommendations: Strengthening, Range of Motion, Manual Therapy - Soft Tissue Mobilization, Pain Management, Home Exercise Program, Patient Education, Integrative Dry Needling, Aquatics, and Modalities    []  Plan of care initiated. Plan to see patient 2 times per week for 8 weeks to address the treatment planned outlined above.   [x]  Continue with current plan of care  []  Modify plan of care as follows:    []  Hold pending physician visit  []  Discharge    Time In 1330   Time Out 1412   Timed Code Minutes: 42   Total Treatment Time: 42     Electronically Signed by: Rohith Cooper PT

## 2022-10-28 NOTE — DISCHARGE INSTRUCTIONS
ANESTHESIA INSTRUCTIONS FOLLOWING SURGERY        Since you may experience some intermittent light-headedness for the next several hours, we suggest you plan on bed rest or quiet relaxation this evening. You must have a friend or relative stay with you tonight. Because of the sedation you have received, it is recommended that you do not drive a motor vehicle, operate any kind of machinery, or sign any contractual agreement for 24 hours following the procedure. You should not take alcoholic beverages tonight and only take sleeping medication that has been specifically prescribed for you by your physician. Call office [phone] if you have:  Temperature greater than 100.4  Persistent nausea and vomiting  Severe uncontrolled pain  Redness, tenderness, or signs of infection (pain, swelling, redness, odor or green/yellow discharge around the site)  Difficulty breathing, headache or visual disturbances  Hives  Persistent dizziness or light-headedness  Extreme fatigue  Any other questions or concerns you may have after discharge    In an emergency, call 911 or go to an Emergency Department at a nearby hospital    It is important to bring a complete, current list of your medications to any medical appointments or hospitalizations. REMINDER:   Carry a list of your medications and allergies with you at all times  Call your pharmacy at least 1 week in advance to refill prescriptions    Diet: Resume your usual diet. Good nutrition promotes healing. Increase fluid intake. Activity: Rest for 24 hrs then resume normal activity. HOME MEDICATIONS:      If on blood thinning products such as; Aspirin, NSAIDS, Plavix, Coumadin, Xarelto, Fish Oil, Multi-Vitamins or Herbal Supplements restart in 24 hours      Restart Metformin in 48 hours if you had procedure with dye. Restart Metformin in 24 hours if no dye used during procedure.         Education Materials Received: {yes/no:025674}  Belongings Returned: {yes/no:141054}          I understand and acknowledge receipt of the above instructions. Patient or Guardian Signature                                                         Date/Time                                                                                                                                            Physician's or R.N.'s Signature                                                                  Date/Time      The discharge instructions have been reviewed with the patient and/or Guardian. Patient and/or Guardian signed and retained a printed copy. Call office [phone] if you have:  Temperature greater than 100.4  Persistent nausea and vomiting  Severe uncontrolled pain  Redness, tenderness, or signs of infection (pain, swelling, redness, odor or green/yellow discharge around the site)  Difficulty breathing, headache or visual disturbances  Hives  Persistent dizziness or light-headedness  Extreme fatigue  Any other questions or concerns you may have after discharge    In an emergency, call 911 or go to an Emergency Department at a nearby hospital    It is important to bring a complete, current list of your medications to any medical appointments or hospitalizations. REMINDER:   Carry a list of your medications and allergies with you at all times  Call your pharmacy at least 1 week in advance to refill prescriptions    Diet: Resume your usual diet. Good nutrition promotes healing. Increase fluid intake. Activity: Rest for 24 hrs then resume normal activity. Education Materials Received: {yes/no:797351}  Belongings Returned: {yes/no:650464}          I understand and acknowledge receipt of the above instructions. Patient or Guardian Signature                                                         Date/Time                                                                                                                                            Physician's or R.N.'s Signature                                                                  Date/Time      The discharge instructions have been reviewed with the patient and/or Guardian. Patient and/or Guardian signed and retained a printed copy.

## 2022-10-31 ENCOUNTER — HOSPITAL ENCOUNTER (OUTPATIENT)
Age: 52
Setting detail: OUTPATIENT SURGERY
Discharge: HOME | End: 2022-10-31
Attending: PAIN MEDICINE | Admitting: PAIN MEDICINE
Payer: COMMERCIAL

## 2022-10-31 ENCOUNTER — APPOINTMENT (OUTPATIENT)
Dept: GENERAL RADIOLOGY | Age: 52
End: 2022-10-31
Attending: PAIN MEDICINE
Payer: COMMERCIAL

## 2022-10-31 VITALS
OXYGEN SATURATION: 99 % | SYSTOLIC BLOOD PRESSURE: 158 MMHG | HEIGHT: 69 IN | BODY MASS INDEX: 24.62 KG/M2 | HEART RATE: 69 BPM | WEIGHT: 166.2 LBS | RESPIRATION RATE: 20 BRPM | DIASTOLIC BLOOD PRESSURE: 94 MMHG | TEMPERATURE: 98.2 F

## 2022-10-31 PROBLEM — M47.812 CERVICAL SPONDYLOSIS: Status: ACTIVE | Noted: 2022-10-31

## 2022-10-31 PROCEDURE — 7100000011 HC PHASE II RECOVERY - ADDTL 15 MIN: Performed by: PAIN MEDICINE

## 2022-10-31 PROCEDURE — 2709999900 HC NON-CHARGEABLE SUPPLY: Performed by: PAIN MEDICINE

## 2022-10-31 PROCEDURE — 3209999900 FLUORO FOR SURGICAL PROCEDURES

## 2022-10-31 PROCEDURE — 6360000002 HC RX W HCPCS: Performed by: PAIN MEDICINE

## 2022-10-31 PROCEDURE — 6360000004 HC RX CONTRAST MEDICATION: Performed by: PAIN MEDICINE

## 2022-10-31 PROCEDURE — 64479 NJX AA&/STRD TFRM EPI C/T 1: CPT | Performed by: PAIN MEDICINE

## 2022-10-31 PROCEDURE — 3600000055 HC PAIN LEVEL 3 ADDL 15 MIN: Performed by: PAIN MEDICINE

## 2022-10-31 PROCEDURE — 7100000010 HC PHASE II RECOVERY - FIRST 15 MIN: Performed by: PAIN MEDICINE

## 2022-10-31 PROCEDURE — 3600000054 HC PAIN LEVEL 3 BASE: Performed by: PAIN MEDICINE

## 2022-10-31 PROCEDURE — 2500000003 HC RX 250 WO HCPCS: Performed by: PAIN MEDICINE

## 2022-10-31 RX ORDER — SODIUM CHLORIDE 9 MG/ML
INJECTION, SOLUTION INTRAVENOUS CONTINUOUS
Status: DISCONTINUED | OUTPATIENT
Start: 2022-10-31 | End: 2022-10-31 | Stop reason: HOSPADM

## 2022-10-31 RX ORDER — LIDOCAINE HYDROCHLORIDE 10 MG/ML
INJECTION, SOLUTION INFILTRATION; PERINEURAL PRN
Status: DISCONTINUED | OUTPATIENT
Start: 2022-10-31 | End: 2022-10-31 | Stop reason: ALTCHOICE

## 2022-10-31 RX ORDER — BUPIVACAINE HYDROCHLORIDE 2.5 MG/ML
INJECTION, SOLUTION EPIDURAL; INFILTRATION; INTRACAUDAL PRN
Status: DISCONTINUED | OUTPATIENT
Start: 2022-10-31 | End: 2022-10-31 | Stop reason: ALTCHOICE

## 2022-10-31 RX ORDER — DEXAMETHASONE SODIUM PHOSPHATE 4 MG/ML
INJECTION, SOLUTION INTRA-ARTICULAR; INTRALESIONAL; INTRAMUSCULAR; INTRAVENOUS; SOFT TISSUE PRN
Status: DISCONTINUED | OUTPATIENT
Start: 2022-10-31 | End: 2022-10-31 | Stop reason: ALTCHOICE

## 2022-10-31 ASSESSMENT — PAIN DESCRIPTION - LOCATION
LOCATION: NECK
LOCATION: NECK

## 2022-10-31 ASSESSMENT — PAIN SCALES - GENERAL
PAINLEVEL_OUTOF10: 8
PAINLEVEL_OUTOF10: 7
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
## 2022-10-31 NOTE — H&P
[address] [address]  History and Physical Update    Pt Name: Alberto Byrd. MRN: [id number]  YOB: 1970  Date of evaluation: 10/31/2022      I have examined the patient and reviewed the H&P/Consult and there are no changes to the patient or plans.         Electronically signed by Candace Ball MD on 10/31/2022 at 8:44 AM
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
## 2022-10-31 NOTE — PROGRESS NOTES
1006 Patient arrived to phase II via cart. Spontaneous respiraitons even and unlabored. Placed on monitor--VSS. Report received from Dat Yanez Assessment completed. Patient is alert and oriented x4. IV infusing-- no complications. Patient states pain 8/10--will monitor. Injection sites clean, dry, and dressed with a band aid. 1009 Pt. Denies needs for snack or drink. Ice pack applied to pt.'s neck for comfort. Pt. Denies all other needs. Call light left within reach. [address] at bedside. Pt. Ingrid Chain. 1024 Pt. States pain has improved to 6/10. Pt. Denies all other needs. BP reassessed, 133/97.  1038 RN at bedside. Pt. States pain has decreased to 4/10. Pt. States readiness to be discharged  1040 Pt. Standing at bedside. Pt. Denies weakness. 1041 IV removed. No complications noted. 1042 Pt. Getting self dressed in bed. Family notified of discharge. 1045 Pt. Taken to private vehicle in stable condition via wheelchair.

## 2022-11-03 ENCOUNTER — APPOINTMENT (OUTPATIENT)
Dept: PHYSICAL THERAPY | Age: 52
End: 2022-11-03
Payer: COMMERCIAL

## 2022-11-07 RX ORDER — LOSARTAN POTASSIUM 25 MG/1
TABLET ORAL
Qty: 90 TABLET | Refills: 1 | Status: SHIPPED | OUTPATIENT
Start: 2022-11-07

## 2022-11-07 NOTE — TELEPHONE ENCOUNTER
Recent Visits  Date Type Provider Dept   05/05/22 Office Visit Ladonna Srivastava, APRN - CNP [facility]   04/21/22 Office Visit Ladonna Srivastava, APRN - CNP [facility]   03/07/22 Office Visit Ladonna Srivastava, APRN - CNP [facility]   01/20/22 Office Visit Ladonna Srivastava, APRN - CNP [facility]   10/12/21 Office Visit Ladonna Srivastava, APRN - CNP [facility]   Showing recent visits within past 540 days with a meds authorizing provider and meeting all other requirements  Future Appointments  No visits were found meeting these conditions.   Showing future appointments within next 150 days with a meds authorizing provider and meeting all other requirements      Future Appointments   Date Time Provider Jennifer Ramirez   11/8/2022 11:30 AM Nikki Aj, PT [facility]   11/10/2022 11:30 AM Nikki Aj, PT [facility]   11/15/2022  9:00 AM BUSHRA Ivory - CNP [facility]   11/16/2022  1:30 PM Nikki Aj, PT [facility] PT [facility]   ]

## 2022-11-08 ENCOUNTER — HOSPITAL ENCOUNTER (OUTPATIENT)
Dept: PHYSICAL THERAPY | Age: 52
Setting detail: THERAPIES SERIES
Discharge: HOME | End: 2022-11-08
Payer: COMMERCIAL

## 2022-11-08 PROCEDURE — 97113 AQUATIC THERAPY/EXERCISES: CPT

## 2022-11-08 NOTE — PROGRESS NOTES
[facility]  PHYSICAL THERAPY  [] EVALUATION  [x] DAILY NOTE (LAND) [] DAILY NOTE (AQUATIC ) [] PROGRESS NOTE [] DISCHARGE NOTE    [x] OUTPATIENT REHABILITATION [place]   [] [place] [address]    [] [facility]   [] [place]     Date: 2022  Patient Name:  Julianne Gamboa  : 1970  MRN: [id number]  CSN: [id number]    Referring Practitioner Jair Bar MD   Diagnosis Other intervertebral disc degeneration, lumbosacral region [M51.37]  Spondylosis without myelopathy or radiculopathy, lumbosacral region [M47.817]  Sciatica, right side [M54.31]    Treatment Diagnosis Chronic neck and back pain, decreased cervical, thoracic, lumbar and hip ROM, core weakness   Date of Evaluation 22    Additional Pertinent History HTN, stroke. Functional Outcome Measure Used Modified Oswestry   Functional Outcome Score 26/50=52% (22) 23/50=46% (10/28/22)      Insurance: Primary: Payor: Arturo Arriaga /  /  / ,   Secondary:    Authorization Information: INSURANCE THERAPY BENEFIT: No precert until after 83CO visit. Visit Limit Based on Precert  AQUATIC THERAPY COVERED:   Yes  MODALITIES COVERED:  Yes except for Iontophoresis and Hot/Cold Packs. TELEHEALTH COVERED: No   Visit # 11, 1/10 for progress note   Visits Allowed: 30   Recertification Date:    Physician Follow-Up: Alveda Lowers 11/15/22; Epidural 10/31/22   Physician Orders:    History of Present Illness: Pt presents with chronic lumbar pain that has been progressively worsening for past 3 years. Pt began having trouble doing his job duties. Pain affects sleep. Pain affects position tolerance. Lifting, return to stand from bending over, standing with prolonged sitting cause pain. Right leg pain throbbing occurs during the night. Pt has pain in neck/upper back as well. XR/MRI showed disc slipping which may be causing pinched nerve in back.  Pt taking prescribed Lyrica and Naproxen for pain control. Pt notes heat helps. SUBJECTIVE: Pt had epidural at right cervical region last week so neck is feeling better. Pt c/o continued mid back pain. Pt took pain medication around 9am this morning. Pt gets pain in right posterior thigh when laying on left side. OBJECTIVE:       AQUATICS TREATMENT   Precautions:     Pain: 7/10 middle LB     X in shaded column indicates activity completed today   Exercise/Intervention Sets/Sec   Notes   Walk Forward 3 laps   X Chair to 4'   Walk Backward 3 laps   X    Walk Sideways 3 laps   X                Lower Extremity Exercises:           Heel/Toe Raises 20*   x     Marches 20*   x     Squats 20*   x     3 Way Hip 20*   x     Hamstring Curls 20*   x     Lunges           Step-Ups                       Lower Extremity Stretches:           HS stretch x3 15 sec x     Seated Exercises:                       Upper Extremity Exercises: In 4'10\"   Shoulder Flexion 10   x     Shoulder ABD/ADD 10   x     Shoulder Horizontal ABD/ADD 10   x     Shoulder IR/ER           Shoulder Circles           Shoulder Shrugs           Rows 10   x     Bicep Curls                       Upper Extremity Stretches:                       Balance:                       Dynamic Gait:                       Deep Water:       2 noodles, 8'   Hang 5 min   x     Bicycle 2 min   x     Hip ABD/ADD 2 min   x     Hip Flex/Ext 2 min   x           Specific Interventions Next Treatment: all aquatics    Activity/Treatment Tolerance:  []  Patient tolerated treatment well  []  Patient limited by fatigue  [x]  Patient limited by pain   []  Patient limited by medical complications  []  Other:     Assessment: Progressed reps with LE exercises with tightness reported in back. Pt notes tingling on right side of spine after deep water exercises. Pain rated 5/10 upon exiting pool.          Goals    Patient Goal:Be painless, do daily activity without pain    Short Term Goals: 4 weeks  Patient will improve AROM of cervical rotation to 35 degrees for scanning environment. Patient will demonstrate good core engagement and postural awareness during therapy session with <3 cues to decrease spinal strain when lifting and cooking. Patient will have <50% lumbar and hip AROM restriction for ease completing ADLs. Long Term Goals: 8 weeks  Patient will demonstrate good body mechanics with lifting and cleaning to decrease spinal stress. Patient will get >4 hours of sleep with medication with less pain in neck and back upon waking. Patient will be independent and compliant with HEP to achieve above goals. Patient Education:   []  HEP/Education Completed: Plan of Care, Goals, attendance policy, pool guidelines and location  [employer] Access Code:  [x]  No new Education completed  []  Reviewed Prior HEP      []  Patient verbalized and/or demonstrated understanding of education provided. []  Patient unable to verbalize and/or demonstrate understanding of education provided. Will continue education. []  Barriers to learning:     PLAN:  Treatment Recommendations: Strengthening, Range of Motion, Manual Therapy - Soft Tissue Mobilization, Pain Management, Home Exercise Program, Patient Education, Integrative Dry Needling, Aquatics, and Modalities    []  Plan of care initiated. Plan to see patient 2 times per week for 8 weeks to address the treatment planned outlined above.   [x]  Continue with current plan of care  []  Modify plan of care as follows:    []  Hold pending physician visit  []  Discharge    Time In 1130   Time Out 1215   Timed Code Minutes: 45   Total Treatment Time: 45     Electronically Signed by: Adeline Workman PT

## 2022-11-10 ENCOUNTER — HOSPITAL ENCOUNTER (OUTPATIENT)
Dept: PHYSICAL THERAPY | Age: 52
Setting detail: THERAPIES SERIES
Discharge: HOME | End: 2022-11-10
Payer: COMMERCIAL

## 2022-11-10 DIAGNOSIS — M62.838 MUSCLE SPASM: ICD-10-CM

## 2022-11-10 DIAGNOSIS — M54.12 CERVICAL RADICULITIS: ICD-10-CM

## 2022-11-10 DIAGNOSIS — S13.4XXD WHIPLASH INJURY TO NECK, SUBSEQUENT ENCOUNTER: ICD-10-CM

## 2022-11-10 DIAGNOSIS — M54.2 CERVICALGIA: ICD-10-CM

## 2022-11-10 DIAGNOSIS — M54.12 CERVICAL RADICULOPATHY: ICD-10-CM

## 2022-11-10 RX ORDER — PREGABALIN 50 MG/1
50 CAPSULE ORAL NIGHTLY
Qty: 30 CAPSULE | Refills: 0 | OUTPATIENT
Start: 2022-11-10 | End: 2022-12-10

## 2022-11-10 RX ORDER — TIZANIDINE 4 MG/1
TABLET ORAL
Qty: 30 TABLET | Refills: 0 | Status: SHIPPED | OUTPATIENT
Start: 2022-11-10

## 2022-11-10 NOTE — TELEPHONE ENCOUNTER
OARRS reviewed. UDS: Negative. Last seen: 6/30/2022.  Follow-up:   Future Appointments   Date Time Provider Jennifer Ashley   11/10/2022 11:30 AM BEATRIZ Treadwell [facility]   11/15/2022  9:00 AM [facility]USHRA Staley [facility]   11/16/2022  1:30 PM BEATRIZ Treadwell [facility]

## 2022-11-10 NOTE — TELEPHONE ENCOUNTER
Recent Visits  Date Type Provider Dept   05/05/22 Office Visit Luis Heart, APRN - CNP [facility]   04/21/22 Office Visit Luis Heart, APRN - CNP [facility]   03/07/22 Office Visit Luis Heart, APRN - CNP [facility]   01/20/22 Office Visit Luis Heart, APRN - CNP [facility]   10/12/21 Office Visit Luis Heart, APRN - CNP [facility]   Showing recent visits within past 540 days with a meds authorizing provider and meeting all other requirements  Future Appointments  No visits were found meeting these conditions.   Showing future appointments within next 150 days with a meds authorizing provider and meeting all other requirements     Future Appointments   Date Time Provider Jennifer Ramirez   11/10/2022 11:30 AM Geni Doctor, PT [facility]   11/15/2022  9:00 AM Fernie Sanchez APRN - CNP [facility]   11/16/2022  1:30 PM Geni Arenas, PT [facility]

## 2022-11-15 ENCOUNTER — OFFICE VISIT (OUTPATIENT)
Dept: PHYSICAL MEDICINE AND REHAB | Age: 52
End: 2022-11-15
Payer: COMMERCIAL

## 2022-11-15 ENCOUNTER — HOSPITAL ENCOUNTER (OUTPATIENT)
Dept: GENERAL RADIOLOGY | Age: 52
Discharge: HOME | End: 2022-11-15

## 2022-11-15 VITALS
SYSTOLIC BLOOD PRESSURE: 142 MMHG | WEIGHT: 166 LBS | BODY MASS INDEX: 24.59 KG/M2 | HEIGHT: 69 IN | DIASTOLIC BLOOD PRESSURE: 89 MMHG

## 2022-11-15 DIAGNOSIS — M47.812 CERVICAL SPONDYLOSIS: ICD-10-CM

## 2022-11-15 DIAGNOSIS — G89.29 CHRONIC MIDLINE THORACIC BACK PAIN: ICD-10-CM

## 2022-11-15 DIAGNOSIS — G89.4 CHRONIC PAIN SYNDROME: ICD-10-CM

## 2022-11-15 DIAGNOSIS — M54.6 CHRONIC MIDLINE THORACIC BACK PAIN: ICD-10-CM

## 2022-11-15 DIAGNOSIS — M79.18 MYOFASCIAL PAIN: ICD-10-CM

## 2022-11-15 DIAGNOSIS — M54.12 CERVICAL RADICULOPATHY: Primary | ICD-10-CM

## 2022-11-15 DIAGNOSIS — M47.814 THORACIC SPONDYLOSIS: ICD-10-CM

## 2022-11-15 DIAGNOSIS — Z00.6 ENCOUNTER FOR EXAMINATION FOR NORMAL COMPARISON AND CONTROL IN CLINICAL RESEARCH PROGRAM: ICD-10-CM

## 2022-11-15 DIAGNOSIS — M54.12 CERVICAL RADICULITIS: ICD-10-CM

## 2022-11-15 PROCEDURE — 4004F PT TOBACCO SCREEN RCVD TLK: CPT | Performed by: NURSE PRACTITIONER

## 2022-11-15 PROCEDURE — G8427 DOCREV CUR MEDS BY ELIG CLIN: HCPCS | Performed by: NURSE PRACTITIONER

## 2022-11-15 PROCEDURE — 3074F SYST BP LT 130 MM HG: CPT | Performed by: NURSE PRACTITIONER

## 2022-11-15 PROCEDURE — 99214 OFFICE O/P EST MOD 30 MIN: CPT | Performed by: NURSE PRACTITIONER

## 2022-11-15 PROCEDURE — 3017F COLORECTAL CA SCREEN DOC REV: CPT | Performed by: NURSE PRACTITIONER

## 2022-11-15 PROCEDURE — G8420 CALC BMI NORM PARAMETERS: HCPCS | Performed by: NURSE PRACTITIONER

## 2022-11-15 PROCEDURE — G8484 FLU IMMUNIZE NO ADMIN: HCPCS | Performed by: NURSE PRACTITIONER

## 2022-11-15 PROCEDURE — 3078F DIAST BP <80 MM HG: CPT | Performed by: NURSE PRACTITIONER
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
## 2022-11-16 ENCOUNTER — HOSPITAL ENCOUNTER (OUTPATIENT)
Dept: PHYSICAL THERAPY | Age: 52
Setting detail: THERAPIES SERIES
Discharge: HOME | End: 2022-11-16
Payer: COMMERCIAL

## 2022-11-16 PROCEDURE — 97113 AQUATIC THERAPY/EXERCISES: CPT

## 2022-11-16 NOTE — PROGRESS NOTES
[address] [facility]  PHYSICAL THERAPY  [] EVALUATION  [x] DAILY NOTE (LAND) [] DAILY NOTE (AQUATIC ) [] PROGRESS NOTE [] DISCHARGE NOTE    [x] OUTPATIENT REHABILITATION [facility]   [] [place] [address]    [] [facility]   [] [facility]    Date: 2022  Patient Name:  Louisa Cooper  : 1970  MRN: [id number]  CSN: [id number]    Referring Practitioner Kaveh Monsivais MD   Diagnosis Other intervertebral disc degeneration, lumbosacral region [M51.37]  Spondylosis without myelopathy or radiculopathy, lumbosacral region [M47.817]  Sciatica, right side [M54.31]    Treatment Diagnosis Chronic neck and back pain, decreased cervical, thoracic, lumbar and hip ROM, core weakness   Date of Evaluation 22    Additional Pertinent History HTN, stroke. Functional Outcome Measure Used Modified Oswestry   Functional Outcome Score 26/50=52% (22) 23/50=46% (10/28/22)      Insurance: Primary: Payor: Jose Miramontes /  /  / ,   Secondary:    Authorization Information: INSURANCE THERAPY BENEFIT: No precert until after 19SF visit. Visit Limit Based on Precert  AQUATIC THERAPY COVERED:   Yes  MODALITIES COVERED:  Yes except for Iontophoresis and Hot/Cold Packs. TELEHEALTH COVERED: No   Visit # 12, 2/10 for progress note   Visits Allowed: 30   Recertification Date:    Physician Follow-Up: Margo Blizzard 1/10/23   Physician Orders:    History of Present Illness: Pt presents with chronic lumbar pain that has been progressively worsening for past 3 years. Pt began having trouble doing his job duties. Pain affects sleep. Pain affects position tolerance. Lifting, return to stand from bending over, standing with prolonged sitting cause pain. Right leg pain throbbing occurs during the night. Pt has pain in neck/upper back as well. XR/MRI showed disc slipping which may be causing pinched nerve in back.  Pt taking prescribed Lyrica and Naproxen for pain control. Pt notes heat helps. SUBJECTIVE: Pt reports epidural seemed to cause more pain. Pt saw pain management yesterday and they are planning to discuss plan with [facility] regarding surgery, injections, etc. Pt feels therapy, especially aquatics, has helped manage pain but pain still persists. OBJECTIVE:       AQUATICS TREATMENT   Precautions:     Pain: 5/10 upper to lower thoracic     X in shaded column indicates activity completed today   Exercise/Intervention Sets/Sec   Notes   Walk Forward 2 laps   X    Walk Backward 2 laps   X    Walk Sideways 2 laps   X                Lower Extremity Exercises:           Heel/Toe Raises 20   x     Marches 20   x     Squats 20   x     3 Way Hip 20   x     Hamstring Curls 20   x     Lunges           Step-Ups                       Lower Extremity Stretches:           HS stretch x3 15 sec x     Seated Exercises:                       Upper Extremity Exercises: In 4'10\"   Shoulder Flexion 10   x     Shoulder ABD/ADD 10   x     Shoulder Horizontal ABD/ADD 10   x     Shoulder IR/ER           Shoulder Circles           Shoulder Shrugs           Rows 10   x     Bicep Curls                       Upper Extremity Stretches:                       Balance:                       Dynamic Gait:                       Deep Water:       2 noodles, 8'   Hang 5 min   x     Bicycle 2 min   x     Hip ABD/ADD 2 min   x     Hip Flex/Ext 2 min   x           Specific Interventions Next Treatment: all aquatics    Activity/Treatment Tolerance:  []  Patient tolerated treatment well  []  Patient limited by fatigue  [x]  Patient limited by pain   []  Patient limited by medical complications  []  Other:     Assessment: Continued with current aquatic program. Pt c/o lower back feeling sore at end of session, but thoracic pain about the same. Pt plans to schedule follow up with Dr. Clayton Postal since the pain persists.  Pt will be placed on hold for 2 weeks to allow pt to follow up with doctor, but if its going to be longer than that, will have to discharge. Goals    Patient Goal:Be painless, do daily activity without pain    Short Term Goals: 4 weeks  Patient will improve AROM of cervical rotation to 35 degrees for scanning environment. Patient will demonstrate good core engagement and postural awareness during therapy session with <3 cues to decrease spinal strain when lifting and cooking. Patient will have <50% lumbar and hip AROM restriction for ease completing ADLs. Long Term Goals: 8 weeks  Patient will demonstrate good body mechanics with lifting and cleaning to decrease spinal stress. Patient will get >4 hours of sleep with medication with less pain in neck and back upon waking. Patient will be independent and compliant with HEP to achieve above goals. Patient Education:   [x]  HEP/Education Completed: schedule follow up with Dr. Linda Sanchez Access Code:  []  No new Education completed  []  Reviewed Prior HEP      [x]  Patient verbalized and/or demonstrated understanding of education provided. []  Patient unable to verbalize and/or demonstrate understanding of education provided. Will continue education. []  Barriers to learning:     PLAN:  Treatment Recommendations: Strengthening, Range of Motion, Manual Therapy - Soft Tissue Mobilization, Pain Management, Home Exercise Program, Patient Education, Integrative Dry Needling, Aquatics, and Modalities    []  Plan of care initiated. Plan to see patient 2 times per week for 8 weeks to address the treatment planned outlined above.   []  Continue with current plan of care  []  Modify plan of care as follows:    [x]  Hold pending physician visit  []  Discharge    Time In 1325   Time Out 1215   Timed Code Minutes: 45   Total Treatment Time: 45     Electronically Signed by: Az Guillory PT

## 2022-11-30 NOTE — DISCHARGE SUMMARY
[facility] NOTE  OUTPATIENT  [place] [address]    Patient Name: Soco Maldonado        CSN: [id number]   YOB: 1970  Gender: male  Carlos Bedolla MD,    Other intervertebral disc degeneration, lumbosacral region [M51.37]  Spondylosis without myelopathy or radiculopathy, lumbosacral region [M47.817]  Sciatica, right side [M54.31] ,      Patient is discharged from Physical Therapy services at this time. See last note for details related to results of therapy and goal achievement. Reason for discharge: Was on hold for physician appointment. Pt never called to schedule after being on hold 2 weeks. Pt will need new order if additional therapy warranted.        Rogelio Holland DPT, #[id number]

## 2023-01-05 DIAGNOSIS — M54.2 CERVICALGIA: ICD-10-CM

## 2023-01-05 DIAGNOSIS — M62.838 MUSCLE SPASM: ICD-10-CM

## 2023-01-05 DIAGNOSIS — S13.4XXD WHIPLASH INJURY TO NECK, SUBSEQUENT ENCOUNTER: ICD-10-CM

## 2023-01-05 RX ORDER — TIZANIDINE 4 MG/1
TABLET ORAL
Qty: 30 TABLET | Refills: 0 | Status: SHIPPED | OUTPATIENT
Start: 2023-01-05

## 2023-01-06 ENCOUNTER — HOSPITAL ENCOUNTER (EMERGENCY)
Age: 53
Discharge: HOME | End: 2023-01-06
Attending: EMERGENCY MEDICINE
Payer: COMMERCIAL

## 2023-01-06 ENCOUNTER — APPOINTMENT (OUTPATIENT)
Dept: GENERAL RADIOLOGY | Age: 53
End: 2023-01-06
Payer: COMMERCIAL

## 2023-01-06 VITALS
RESPIRATION RATE: 14 BRPM | DIASTOLIC BLOOD PRESSURE: 78 MMHG | SYSTOLIC BLOOD PRESSURE: 116 MMHG | HEIGHT: 69 IN | HEART RATE: 81 BPM | TEMPERATURE: 97 F | BODY MASS INDEX: 24.44 KG/M2 | WEIGHT: 165 LBS | OXYGEN SATURATION: 100 %

## 2023-01-06 DIAGNOSIS — R53.83 FATIGUE, UNSPECIFIED TYPE: Primary | ICD-10-CM

## 2023-01-06 DIAGNOSIS — E86.0 DEHYDRATION: ICD-10-CM

## 2023-01-06 LAB
ANION GAP SERPL CALCULATED.3IONS-SCNC: 10 MEQ/L (ref 8–16)
BASOPHILS # BLD: 0.4 %
BASOPHILS ABSOLUTE: 0 THOU/MM3 (ref 0–0.1)
BUN BLDV-MCNC: 15 MG/DL (ref 7–22)
CALCIUM SERPL-MCNC: 8.7 MG/DL (ref 8.5–10.5)
CHLORIDE BLD-SCNC: 105 MEQ/L (ref 98–111)
CO2: 25 MEQ/L (ref 23–33)
CREAT SERPL-MCNC: 1.2 MG/DL (ref 0.4–1.2)
EKG ATRIAL RATE: 87 BPM
EKG P AXIS: 85 DEGREES
EKG P-R INTERVAL: 138 MS
EKG Q-T INTERVAL: 380 MS
EKG QRS DURATION: 88 MS
EKG QTC CALCULATION (BAZETT): 457 MS
EKG R AXIS: 81 DEGREES
EKG T AXIS: 74 DEGREES
EKG VENTRICULAR RATE: 87 BPM
EOSINOPHIL # BLD: 1.6 %
EOSINOPHILS ABSOLUTE: 0.1 THOU/MM3 (ref 0–0.4)
ERYTHROCYTE [DISTWIDTH] IN BLOOD BY AUTOMATED COUNT: 15.7 % (ref 11.5–14.5)
ERYTHROCYTE [DISTWIDTH] IN BLOOD BY AUTOMATED COUNT: 52.7 FL (ref 35–45)
GFR SERPL CREATININE-BSD FRML MDRD: > 60 ML/MIN/1.73M2
GLUCOSE BLD-MCNC: 168 MG/DL (ref 70–108)
HCT VFR BLD CALC: 44.8 % (ref 42–52)
HEMOGLOBIN: 15.3 GM/DL (ref 14–18)
IMMATURE GRANS (ABS): 0.05 THOU/MM3 (ref 0–0.07)
IMMATURE GRANULOCYTES: 0.7 %
INFLUENZA A: NOT DETECTED
INFLUENZA B: NOT DETECTED
LYMPHOCYTES # BLD: 55.2 %
LYMPHOCYTES ABSOLUTE: 4.1 THOU/MM3 (ref 1–4.8)
MCH RBC QN AUTO: 31.5 PG (ref 26–33)
MCHC RBC AUTO-ENTMCNC: 34.2 GM/DL (ref 32.2–35.5)
MCV RBC AUTO: 92.2 FL (ref 80–94)
MONOCYTES # BLD: 9.2 %
MONOCYTES ABSOLUTE: 0.7 THOU/MM3 (ref 0.4–1.3)
NUCLEATED RED BLOOD CELLS: 0 /100 WBC
OSMOLALITY CALCULATION: 284.1 MOSMOL/KG (ref 275–300)
PLATELET # BLD: 195 THOU/MM3 (ref 130–400)
PMV BLD AUTO: 8 FL (ref 9.4–12.4)
POTASSIUM REFLEX MAGNESIUM: 4.8 MEQ/L (ref 3.5–5.2)
PRO-BNP: < 36 PG/ML (ref 0–124)
RBC # BLD: 4.86 MILL/MM3 (ref 4.7–6.1)
SARS-COV-2 RNA, RT PCR: NOT DETECTED
SEG NEUTROPHILS: 32.9 %
SEGMENTED NEUTROPHILS ABSOLUTE COUNT: 2.5 THOU/MM3 (ref 1.8–7.7)
SODIUM BLD-SCNC: 140 MEQ/L (ref 135–145)
TROPONIN T: < 0.01 NG/ML
WBC # BLD: 7.5 THOU/MM3 (ref 4.8–10.8)

## 2023-01-06 PROCEDURE — 2580000003 HC RX 258: Performed by: EMERGENCY MEDICINE

## 2023-01-06 PROCEDURE — 80048 BASIC METABOLIC PNL TOTAL CA: CPT

## 2023-01-06 PROCEDURE — 85025 COMPLETE CBC W/AUTO DIFF WBC: CPT

## 2023-01-06 PROCEDURE — 83880 ASSAY OF NATRIURETIC PEPTIDE: CPT

## 2023-01-06 PROCEDURE — 87636 SARSCOV2 & INF A&B AMP PRB: CPT

## 2023-01-06 PROCEDURE — 96360 HYDRATION IV INFUSION INIT: CPT

## 2023-01-06 PROCEDURE — 93005 ELECTROCARDIOGRAM TRACING: CPT | Performed by: EMERGENCY MEDICINE

## 2023-01-06 PROCEDURE — 84484 ASSAY OF TROPONIN QUANT: CPT

## 2023-01-06 PROCEDURE — 71045 X-RAY EXAM CHEST 1 VIEW: CPT

## 2023-01-06 PROCEDURE — 99285 EMERGENCY DEPT VISIT HI MDM: CPT

## 2023-01-06 PROCEDURE — 36415 COLL VENOUS BLD VENIPUNCTURE: CPT

## 2023-01-06 RX ORDER — 0.9 % SODIUM CHLORIDE 0.9 %
500 INTRAVENOUS SOLUTION INTRAVENOUS ONCE
Status: COMPLETED | OUTPATIENT
Start: 2023-01-06 | End: 2023-01-06

## 2023-01-06 RX ADMIN — SODIUM CHLORIDE 500 ML: 9 INJECTION, SOLUTION INTRAVENOUS at 13:58

## 2023-01-06 ASSESSMENT — PAIN - FUNCTIONAL ASSESSMENT
PAIN_FUNCTIONAL_ASSESSMENT: NONE - DENIES PAIN
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
## 2023-01-06 NOTE — ED NOTES
Pt states he is having a lot easier time breathing. Vitals stable. Telemetry in place.       Linda Hernandez RN  01/06/23 0871

## 2023-01-06 NOTE — ED TRIAGE NOTES
Pt to ED through triage with c/c SOB and fatigue that started 10 minutes PTA. Pt denies pain. EKG completed on arrival. Telemetry in place.

## 2023-01-23 ENCOUNTER — OFFICE VISIT (OUTPATIENT)
Dept: PHYSICAL MEDICINE AND REHAB | Age: 53
End: 2023-01-23

## 2023-01-23 VITALS
BODY MASS INDEX: 24.44 KG/M2 | WEIGHT: 165 LBS | HEIGHT: 69 IN | DIASTOLIC BLOOD PRESSURE: 84 MMHG | SYSTOLIC BLOOD PRESSURE: 132 MMHG

## 2023-01-23 DIAGNOSIS — G89.29 CHRONIC MIDLINE THORACIC BACK PAIN: ICD-10-CM

## 2023-01-23 DIAGNOSIS — M54.12 CERVICAL RADICULITIS: ICD-10-CM

## 2023-01-23 DIAGNOSIS — M54.12 CERVICAL RADICULOPATHY: Primary | ICD-10-CM

## 2023-01-23 DIAGNOSIS — M47.812 CERVICAL SPONDYLOSIS: ICD-10-CM

## 2023-01-23 DIAGNOSIS — M47.814 THORACIC SPONDYLOSIS: ICD-10-CM

## 2023-01-23 DIAGNOSIS — M79.18 MYOFASCIAL PAIN: ICD-10-CM

## 2023-01-23 DIAGNOSIS — M51.37 DDD (DEGENERATIVE DISC DISEASE), LUMBOSACRAL: ICD-10-CM

## 2023-01-23 DIAGNOSIS — M47.816 LUMBAR SPONDYLOSIS: ICD-10-CM

## 2023-01-23 DIAGNOSIS — G89.4 CHRONIC PAIN SYNDROME: ICD-10-CM

## 2023-01-23 DIAGNOSIS — M54.6 CHRONIC MIDLINE THORACIC BACK PAIN: ICD-10-CM

## 2023-01-23 RX ORDER — METHOCARBAMOL 100 MG/ML
100 INJECTION, SOLUTION INTRAMUSCULAR; INTRAVENOUS ONCE
Status: COMPLETED | OUTPATIENT
Start: 2023-01-23 | End: 2023-01-23

## 2023-01-23 RX ADMIN — METHOCARBAMOL 100 MG: 100 INJECTION, SOLUTION INTRAMUSCULAR; INTRAVENOUS at 16:09
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
## 2023-04-04 ENCOUNTER — OFFICE VISIT (OUTPATIENT)
Dept: PHYSICAL MEDICINE AND REHAB | Age: 53
End: 2023-04-04
Payer: COMMERCIAL

## 2023-04-04 ENCOUNTER — TELEPHONE (OUTPATIENT)
Dept: PHYSICAL MEDICINE AND REHAB | Age: 53
End: 2023-04-04

## 2023-04-04 VITALS
DIASTOLIC BLOOD PRESSURE: 78 MMHG | SYSTOLIC BLOOD PRESSURE: 116 MMHG | WEIGHT: 165 LBS | HEIGHT: 69 IN | BODY MASS INDEX: 24.44 KG/M2

## 2023-04-04 DIAGNOSIS — G89.4 CHRONIC PAIN SYNDROME: ICD-10-CM

## 2023-04-04 DIAGNOSIS — M47.812 CERVICAL SPONDYLOSIS: ICD-10-CM

## 2023-04-04 DIAGNOSIS — M47.814 THORACIC SPONDYLOSIS: Primary | ICD-10-CM

## 2023-04-04 DIAGNOSIS — M54.6 CHRONIC MIDLINE THORACIC BACK PAIN: ICD-10-CM

## 2023-04-04 DIAGNOSIS — M47.816 LUMBAR SPONDYLOSIS: ICD-10-CM

## 2023-04-04 DIAGNOSIS — G89.29 CHRONIC MIDLINE THORACIC BACK PAIN: ICD-10-CM

## 2023-04-04 DIAGNOSIS — M79.18 MYOFASCIAL PAIN: ICD-10-CM

## 2023-04-04 PROCEDURE — 3017F COLORECTAL CA SCREEN DOC REV: CPT | Performed by: NURSE PRACTITIONER

## 2023-04-04 PROCEDURE — G8427 DOCREV CUR MEDS BY ELIG CLIN: HCPCS | Performed by: NURSE PRACTITIONER

## 2023-04-04 PROCEDURE — G8420 CALC BMI NORM PARAMETERS: HCPCS | Performed by: NURSE PRACTITIONER

## 2023-04-04 PROCEDURE — 3078F DIAST BP <80 MM HG: CPT | Performed by: NURSE PRACTITIONER

## 2023-04-04 PROCEDURE — 4004F PT TOBACCO SCREEN RCVD TLK: CPT | Performed by: NURSE PRACTITIONER

## 2023-04-04 PROCEDURE — 99214 OFFICE O/P EST MOD 30 MIN: CPT | Performed by: NURSE PRACTITIONER

## 2023-04-04 PROCEDURE — 3074F SYST BP LT 130 MM HG: CPT | Performed by: NURSE PRACTITIONER

## 2023-04-04 NOTE — PROGRESS NOTES
patient did not discuss with me any type of low back pain. I will see him back in 8 weeks after he is done with therapy and follows up with [facility] to determine plan. Requested notes and images from [facility], did call the office today and they stated they had nothing on file for him. Discussed with patient again, he states he did see Dr. Iveth Schwab on 16th he has had images after. We will request these images, did have patient's sign release form as well. We discussed with him trigger point injections today to be completed to assist with pain control at this time. He is agreeable to this. I will see him back in 6 weeks to reevaluate. Discussed with multiple pain complaints, but most significant is thoracic facet mediated body pain, we will start with thoracic medial branch blocks targeting bilateral T4-5, T5-6. Also discussed increasing naproxen, his primary is taking care of this at this time. He is instructed to call if he would like me to take over this and Zanaflex. Also discussed possibly retrialing neuropathic pain control medication such as Lyrica. I will see him back after procedures are completed    Plan: The following treatment recommendations and plan were discussed in detail with Joan Johnson. .    Imaging:   I have reviewed patients imaging of Cervical and Thoracic MRI, EMG, thoracic CT and results were discussed with patient today. Analgesics: The patient is currently managing their pain with the use of Naproxen, Zanaflex which is prescribed by PCP, BUSHRA Vivas. Advised to not take medication on empty stomach. Can take over if needed, instructed to call if needed    We recommend that the patient follow the recommendation of the prescribing provider with regard to the above medication(s) and contact their prescribing provider for refills if needed. Adjuvants:     OARRS reviewed, pain contract agreement signed    Interventions:    In presence of thoracic axial body pain and

## 2023-05-16 ENCOUNTER — OFFICE VISIT (OUTPATIENT)
Dept: FAMILY MEDICINE CLINIC | Age: 53
End: 2023-05-16
Payer: COMMERCIAL

## 2023-05-16 VITALS
BODY MASS INDEX: 24.29 KG/M2 | WEIGHT: 164 LBS | RESPIRATION RATE: 16 BRPM | OXYGEN SATURATION: 96 % | DIASTOLIC BLOOD PRESSURE: 82 MMHG | SYSTOLIC BLOOD PRESSURE: 132 MMHG | HEART RATE: 82 BPM | TEMPERATURE: 97.8 F | HEIGHT: 69 IN

## 2023-05-16 DIAGNOSIS — M54.2 CERVICALGIA: ICD-10-CM

## 2023-05-16 DIAGNOSIS — Z13.220 SCREENING CHOLESTEROL LEVEL: ICD-10-CM

## 2023-05-16 DIAGNOSIS — M62.838 MUSCLE SPASM: ICD-10-CM

## 2023-05-16 DIAGNOSIS — I10 ESSENTIAL HYPERTENSION: ICD-10-CM

## 2023-05-16 DIAGNOSIS — Z00.00 ENCOUNTER FOR WELL ADULT EXAM WITHOUT ABNORMAL FINDINGS: Primary | ICD-10-CM

## 2023-05-16 DIAGNOSIS — Z72.0 TOBACCO ABUSE: ICD-10-CM

## 2023-05-16 DIAGNOSIS — M54.31 SCIATICA OF RIGHT SIDE: ICD-10-CM

## 2023-05-16 DIAGNOSIS — Z13.31 DEPRESSION SCREENING NEGATIVE: ICD-10-CM

## 2023-05-16 DIAGNOSIS — Z12.5 SCREENING FOR PROSTATE CANCER: ICD-10-CM

## 2023-05-16 DIAGNOSIS — S13.4XXD WHIPLASH INJURY TO NECK, SUBSEQUENT ENCOUNTER: ICD-10-CM

## 2023-05-16 PROCEDURE — 3075F SYST BP GE 130 - 139MM HG: CPT | Performed by: NURSE PRACTITIONER

## 2023-05-16 PROCEDURE — 99396 PREV VISIT EST AGE 40-64: CPT | Performed by: NURSE PRACTITIONER

## 2023-05-16 PROCEDURE — 3017F COLORECTAL CA SCREEN DOC REV: CPT | Performed by: NURSE PRACTITIONER

## 2023-05-16 PROCEDURE — 4004F PT TOBACCO SCREEN RCVD TLK: CPT | Performed by: NURSE PRACTITIONER

## 2023-05-16 PROCEDURE — 99213 OFFICE O/P EST LOW 20 MIN: CPT | Performed by: NURSE PRACTITIONER

## 2023-05-16 PROCEDURE — G8427 DOCREV CUR MEDS BY ELIG CLIN: HCPCS | Performed by: NURSE PRACTITIONER

## 2023-05-16 PROCEDURE — 3079F DIAST BP 80-89 MM HG: CPT | Performed by: NURSE PRACTITIONER

## 2023-05-16 PROCEDURE — G8420 CALC BMI NORM PARAMETERS: HCPCS | Performed by: NURSE PRACTITIONER

## 2023-05-16 RX ORDER — AMLODIPINE BESYLATE 5 MG/1
5 TABLET ORAL DAILY
Qty: 90 TABLET | Refills: 4 | Status: SHIPPED | OUTPATIENT
Start: 2023-05-16

## 2023-05-16 RX ORDER — PREDNISONE 20 MG/1
TABLET ORAL
Qty: 18 TABLET | Refills: 0 | Status: SHIPPED | OUTPATIENT
Start: 2023-05-16

## 2023-05-16 RX ORDER — TIZANIDINE 4 MG/1
TABLET ORAL
Qty: 30 TABLET | Refills: 3 | Status: SHIPPED | OUTPATIENT
Start: 2023-05-16

## 2023-05-16 RX ORDER — LOSARTAN POTASSIUM 25 MG/1
25 TABLET ORAL DAILY
Qty: 90 TABLET | Refills: 4 | Status: SHIPPED | OUTPATIENT
Start: 2023-05-16

## 2023-05-16 SDOH — ECONOMIC STABILITY: INCOME INSECURITY: HOW HARD IS IT FOR YOU TO PAY FOR THE VERY BASICS LIKE FOOD, HOUSING, MEDICAL CARE, AND HEATING?: NOT HARD AT ALL

## 2023-05-16 SDOH — ECONOMIC STABILITY: FOOD INSECURITY: WITHIN THE PAST 12 MONTHS, THE FOOD YOU BOUGHT JUST DIDN'T LAST AND YOU DIDN'T HAVE MONEY TO GET MORE.: NEVER TRUE

## 2023-05-16 SDOH — ECONOMIC STABILITY: FOOD INSECURITY: WITHIN THE PAST 12 MONTHS, YOU WORRIED THAT YOUR FOOD WOULD RUN OUT BEFORE YOU GOT MONEY TO BUY MORE.: NEVER TRUE

## 2023-05-16 SDOH — ECONOMIC STABILITY: HOUSING INSECURITY
IN THE LAST 12 MONTHS, WAS THERE A TIME WHEN YOU DID NOT HAVE A STEADY PLACE TO SLEEP OR SLEPT IN A SHELTER (INCLUDING NOW)?: NO

## 2023-05-16 ASSESSMENT — PATIENT HEALTH QUESTIONNAIRE - PHQ9
SUM OF ALL RESPONSES TO PHQ QUESTIONS 1-9: 0
1. LITTLE INTEREST OR PLEASURE IN DOING THINGS: 0
2. FEELING DOWN, DEPRESSED OR HOPELESS: 0
SUM OF ALL RESPONSES TO PHQ QUESTIONS 1-9: 0
SUM OF ALL RESPONSES TO PHQ9 QUESTIONS 1 & 2: 0

## 2023-05-16 ASSESSMENT — ENCOUNTER SYMPTOMS
RESPIRATORY NEGATIVE: 1
GASTROINTESTINAL NEGATIVE: 1
BACK PAIN: 1

## 2023-05-16 NOTE — PROGRESS NOTES
Well Adult Note  Name: oJse Mendoza LCXUIO Date: 2023   MRN: [id number] Sex: Male   Age: 47 y.o. Ethnicity: Non- / Non    : 1970 Race: Black / African American      Jose Enamorado. is here for well adult exam.  History:  Pt eats a wide variety of foods  Rich depression, sleeps well at night    No formal activity plan    Duran shave some chronic pain issues and sees PM for this this does limit his physical activity level. Last colonoscopy  and normal    Pt states he his having right sided buttock pain that radiates down his right leg, it throbs at night when laying in bed, denies an injury Rich N/T of RLE    HTN    Does patient check BP regularly at home? - No  Current Medication regimen - cozaar 25 mg daily norvasc 5 mg daily   Tolerating medications well? - yes    Shortness of breath or chest pain? No  Headache or visual complaints? No  Neurologic changes like confusion? No  Extremity edema? No    BP Readings from Last 3 Encounters:   23 132/82   23 116/78   23 132/84          Review of Systems   Constitutional:  Negative for chills, fatigue and fever. HENT: Negative. Respiratory: Negative. Cardiovascular: Negative. Gastrointestinal: Negative. Genitourinary: Negative. Musculoskeletal:  Positive for arthralgias and back pain. Negative for joint swelling, myalgias, neck pain and neck stiffness. Skin: Negative. Allergic/Immunologic: Negative for environmental allergies. Neurological: Negative. Hematological: Negative. Psychiatric/Behavioral:  Negative for self-injury, sleep disturbance and suicidal ideas. No Known Allergies      Prior to Visit Medications    Medication Sig Taking?  Authorizing Provider   naproxen (NAPROSYN) 375 MG tablet take 1 tablet by mouth twice a day with meals Yes Luis Heart, APRN - CNP   tiZANidine (ZANAFLEX) 4 MG tablet take 1 tablet by mouth ONCE nightly if needed for pain Yes Tereso

## 2023-07-24 ENCOUNTER — TELEPHONE (OUTPATIENT)
Dept: FAMILY MEDICINE CLINIC | Age: 53
End: 2023-07-24

## 2023-07-24 RX ORDER — AMOXICILLIN 500 MG/1
500 CAPSULE ORAL 3 TIMES DAILY
Qty: 30 CAPSULE | Refills: 0 | Status: SHIPPED | OUTPATIENT
Start: 2023-07-24 | End: 2023-08-03

## 2023-07-24 NOTE — TELEPHONE ENCOUNTER
Amoxicillin sent in for him, if no better after atb f/u with me for any additional meds, also warm salt water gargles

## 2023-07-24 NOTE — TELEPHONE ENCOUNTER
Patient is requesting an antibiotic for an abscess tooth   Patient can not get in to see the dentist until December 12 2023  Rx to go to [facility]

## 2023-09-08 ENCOUNTER — APPOINTMENT (OUTPATIENT)
Dept: GENERAL RADIOLOGY | Age: 53
End: 2023-09-08
Payer: COMMERCIAL

## 2023-09-08 ENCOUNTER — HOSPITAL ENCOUNTER (EMERGENCY)
Age: 53
Discharge: HOME | End: 2023-09-08
Attending: EMERGENCY MEDICINE
Payer: COMMERCIAL

## 2023-09-08 VITALS
TEMPERATURE: 98.3 F | RESPIRATION RATE: 16 BRPM | BODY MASS INDEX: 23.55 KG/M2 | HEART RATE: 88 BPM | WEIGHT: 159 LBS | SYSTOLIC BLOOD PRESSURE: 127 MMHG | OXYGEN SATURATION: 99 % | HEIGHT: 69 IN | DIASTOLIC BLOOD PRESSURE: 91 MMHG

## 2023-09-08 DIAGNOSIS — S89.92XA KNEE INJURIES, LEFT, INITIAL ENCOUNTER: Primary | ICD-10-CM

## 2023-09-08 PROCEDURE — 93010 ELECTROCARDIOGRAM REPORT: CPT | Performed by: INTERNAL MEDICINE

## 2023-09-08 PROCEDURE — 93005 ELECTROCARDIOGRAM TRACING: CPT | Performed by: STUDENT IN AN ORGANIZED HEALTH CARE EDUCATION/TRAINING PROGRAM

## 2023-09-08 PROCEDURE — 99284 EMERGENCY DEPT VISIT MOD MDM: CPT

## 2023-09-08 PROCEDURE — 73564 X-RAY EXAM KNEE 4 OR MORE: CPT

## 2023-09-08 ASSESSMENT — PAIN SCALES - GENERAL: PAINLEVEL_OUTOF10: 9

## 2023-09-08 ASSESSMENT — PAIN DESCRIPTION - LOCATION: LOCATION: KNEE

## 2023-09-08 ASSESSMENT — PAIN DESCRIPTION - DESCRIPTORS: DESCRIPTORS: BURNING

## 2023-09-08 ASSESSMENT — PAIN DESCRIPTION - PAIN TYPE: TYPE: ACUTE PAIN

## 2023-09-08 ASSESSMENT — PAIN - FUNCTIONAL ASSESSMENT: PAIN_FUNCTIONAL_ASSESSMENT: 0-10

## 2023-09-08 ASSESSMENT — PAIN DESCRIPTION - ORIENTATION: ORIENTATION: LEFT

## 2023-09-08 NOTE — DISCHARGE INSTRUCTIONS
You are seen today in the emergency department for knee pain. There are no broken bones on your x-ray. You may still have injury to the knee. Please see orthopedics regarding this. Please see  your family doctor regarding your episode of passing out. You may need medication adjustments    You can take Tylenol/Advil for pain at home.

## 2023-09-08 NOTE — ED PROVIDER NOTES

## 2023-09-10 LAB
EKG ATRIAL RATE: 79 BPM
EKG P AXIS: 81 DEGREES
EKG P-R INTERVAL: 140 MS
EKG Q-T INTERVAL: 382 MS
EKG QRS DURATION: 94 MS
EKG QTC CALCULATION (BAZETT): 438 MS
EKG R AXIS: 74 DEGREES
EKG T AXIS: 64 DEGREES
EKG VENTRICULAR RATE: 79 BPM

## 2023-10-09 ENCOUNTER — TELEPHONE (OUTPATIENT)
Dept: FAMILY MEDICINE CLINIC | Age: 53
End: 2023-10-09

## 2023-10-09 RX ORDER — PENICILLIN V POTASSIUM 500 MG/1
500 TABLET ORAL 4 TIMES DAILY
Qty: 40 TABLET | Refills: 0 | Status: SHIPPED | OUTPATIENT
Start: 2023-10-09 | End: 2023-10-19

## 2023-10-09 NOTE — TELEPHONE ENCOUNTER
Let pt know I sent PCN in for his dental pain, also he can use tylenol/motrin for pain, avoid chewing on the affected side and chew soft foods.

## 2023-10-09 NOTE — TELEPHONE ENCOUNTER
Pt requesting abx for swelling on the bottom right side of face  from a tooth    Pt doesn't have a dental appt until 12/12/23   [facility]

## 2024-01-02 ENCOUNTER — TELEPHONE (OUTPATIENT)
Dept: FAMILY MEDICINE CLINIC | Age: 54
End: 2024-01-02

## 2024-01-02 RX ORDER — PENICILLIN V POTASSIUM 500 MG/1
TABLET ORAL
Qty: 40 TABLET | Refills: 0 | OUTPATIENT
Start: 2024-01-02

## 2024-01-02 NOTE — TELEPHONE ENCOUNTER
Recent Visits  Date Type Provider Dept   05/16/23 Office Visit Tereso Castro APRN - CNP [facility]   Showing recent visits within past 540 days with a meds authorizing provider and meeting all other requirements  Future Appointments  Date Type Provider Dept   05/20/24 Appointment Tereso Castro APRN - CNP [facility]   Showing future appointments within next 150 days with a meds authorizing provider and meeting all other requirements

## 2024-01-02 NOTE — TELEPHONE ENCOUNTER
Received a refill request for Penicillin, pt has not been seen since 5/2024, does he need an appt, does he have an infection? Please schedule if needing atb thanks

## 2024-01-04 NOTE — TELEPHONE ENCOUNTER
Left message on answering machine requesting pt to call back at earliest convenience.     I have been unable to reach this patient by phone.  A letter is being sent.

## 2024-01-04 NOTE — TELEPHONE ENCOUNTER
Pt informed and understanding with no further questions at this time.     Future Appointments   Date Time Provider Department Center   1/8/2024 10:20 AM Tereso Castro APRN - CNP [facility] [place]   5/20/2024  1:00 PM Tereso Castro APRN - CNP [facility] [place]

## 2024-01-09 ENCOUNTER — OFFICE VISIT (OUTPATIENT)
Dept: FAMILY MEDICINE CLINIC | Age: 54
End: 2024-01-09
Payer: COMMERCIAL

## 2024-01-09 VITALS
WEIGHT: 159.6 LBS | SYSTOLIC BLOOD PRESSURE: 128 MMHG | BODY MASS INDEX: 23.64 KG/M2 | HEART RATE: 74 BPM | HEIGHT: 69 IN | DIASTOLIC BLOOD PRESSURE: 78 MMHG | OXYGEN SATURATION: 99 % | TEMPERATURE: 98.1 F | RESPIRATION RATE: 16 BRPM

## 2024-01-09 DIAGNOSIS — K08.9 CHRONIC DENTAL PAIN: Primary | ICD-10-CM

## 2024-01-09 DIAGNOSIS — G89.29 CHRONIC DENTAL PAIN: Primary | ICD-10-CM

## 2024-01-09 DIAGNOSIS — S02.5XXK CLOSED FRACTURE OF TOOTH WITH NONUNION, SUBSEQUENT ENCOUNTER: ICD-10-CM

## 2024-01-09 DIAGNOSIS — K04.7 DENTAL ABSCESS: ICD-10-CM

## 2024-01-09 PROCEDURE — 99213 OFFICE O/P EST LOW 20 MIN: CPT | Performed by: NURSE PRACTITIONER

## 2024-01-09 PROCEDURE — 3017F COLORECTAL CA SCREEN DOC REV: CPT | Performed by: NURSE PRACTITIONER

## 2024-01-09 PROCEDURE — G8427 DOCREV CUR MEDS BY ELIG CLIN: HCPCS | Performed by: NURSE PRACTITIONER

## 2024-01-09 PROCEDURE — 3078F DIAST BP <80 MM HG: CPT | Performed by: NURSE PRACTITIONER

## 2024-01-09 PROCEDURE — G8484 FLU IMMUNIZE NO ADMIN: HCPCS | Performed by: NURSE PRACTITIONER

## 2024-01-09 PROCEDURE — 3074F SYST BP LT 130 MM HG: CPT | Performed by: NURSE PRACTITIONER

## 2024-01-09 PROCEDURE — 4004F PT TOBACCO SCREEN RCVD TLK: CPT | Performed by: NURSE PRACTITIONER

## 2024-01-09 PROCEDURE — G8420 CALC BMI NORM PARAMETERS: HCPCS | Performed by: NURSE PRACTITIONER

## 2024-01-09 RX ORDER — PENICILLIN V POTASSIUM 500 MG/1
500 TABLET ORAL 4 TIMES DAILY
Qty: 40 TABLET | Refills: 0 | Status: SHIPPED | OUTPATIENT
Start: 2024-01-09 | End: 2024-01-19

## 2024-01-09 RX ORDER — CHLORHEXIDINE GLUCONATE ORAL RINSE 1.2 MG/ML
15 SOLUTION DENTAL 2 TIMES DAILY
Qty: 420 ML | Refills: 0 | Status: SHIPPED | OUTPATIENT
Start: 2024-01-09 | End: 2024-01-23

## 2024-01-09 RX ORDER — LIDOCAINE HYDROCHLORIDE 20 MG/ML
15 SOLUTION OROPHARYNGEAL 2 TIMES DAILY
Qty: 300 ML | Refills: 0 | Status: SHIPPED | OUTPATIENT
Start: 2024-01-09 | End: 2024-01-19

## 2024-01-09 ASSESSMENT — PATIENT HEALTH QUESTIONNAIRE - PHQ9
SUM OF ALL RESPONSES TO PHQ9 QUESTIONS 1 & 2: 0
SUM OF ALL RESPONSES TO PHQ QUESTIONS 1-9: 0
2. FEELING DOWN, DEPRESSED OR HOPELESS: 0
SUM OF ALL RESPONSES TO PHQ QUESTIONS 1-9: 0
1. LITTLE INTEREST OR PLEASURE IN DOING THINGS: 0

## 2024-01-09 ASSESSMENT — ENCOUNTER SYMPTOMS
VOICE CHANGE: 0
TROUBLE SWALLOWING: 0
RESPIRATORY NEGATIVE: 1
SINUS PRESSURE: 0
SINUS PAIN: 0

## 2024-01-09 NOTE — PROGRESS NOTES
[facility]  [address]  [place] [address]  Dept: [phone]  Dept Fax: [phone]  Loc: [phone]    Luis Sagastume Sr. is a 53 y.o. malewho presents today for his medical conditions/complaints as noted below.  Luis Sagastume Sr.is c/o of Follow-up (Dental pain started in October getting worse. Can't get into the dentist until July )      :     HPI    Pt with c/o dental pain  Has had this off and on for 3 -4 months.   Had a dental appt but it got cancelled, looking for a new dentist.  States he has left lower dental pain has a fractured in this area  Hurts to chew on this side and heat and cold affect it  No fever.    Has had atb for this in the past and it did help  Taking naprosyn bid    Going on a constantine in April asking about meds for motion sickness, discussed zofran, dramamine and scopalamine patch, pt chooses to go the patch route, will contract us when ready for this.     Current Outpatient Medications   Medication Sig Dispense Refill    chlorhexidine (PERIDEX) 0.12 % solution Swish and spit 15 mLs 2 times daily for 14 days 420 mL 0    penicillin v potassium (VEETID) 500 MG tablet Take 1 tablet by mouth 4 times daily for 10 days 40 tablet 0    lidocaine viscous hcl (XYLOCAINE) 2 % SOLN solution Take 15 mLs by mouth in the morning and at bedtime for 10 days 300 mL 0    losartan (COZAAR) 25 MG tablet Take 1 tablet by mouth daily 90 tablet 4    amLODIPine (NORVASC) 5 MG tablet Take 1 tablet by mouth daily 90 tablet 4    tiZANidine (ZANAFLEX) 4 MG tablet take 1 tablet by mouth ONCE nightly if needed for pain 30 tablet 3    naproxen (NAPROSYN) 375 MG tablet take 1 tablet by mouth twice a day with meals 180 tablet 1    predniSONE (DELTASONE) 20 MG tablet 1 tab po tid for 3 days 1 tab po bid for 3 days 1 tab po once day for 3 days (Patient not taking: Reported on 1/9/2024) 18 tablet 0    pregabalin (LYRICA) 50 MG capsule Take 1 capsule

## 2024-03-13 ENCOUNTER — OFFICE VISIT (OUTPATIENT)
Dept: PHYSICAL MEDICINE AND REHAB | Age: 54
End: 2024-03-13
Payer: MEDICARE

## 2024-03-13 VITALS
WEIGHT: 159.61 LBS | BODY MASS INDEX: 23.64 KG/M2 | SYSTOLIC BLOOD PRESSURE: 130 MMHG | HEIGHT: 69 IN | DIASTOLIC BLOOD PRESSURE: 72 MMHG

## 2024-03-13 DIAGNOSIS — G89.4 CHRONIC PAIN SYNDROME: ICD-10-CM

## 2024-03-13 DIAGNOSIS — M51.37 DDD (DEGENERATIVE DISC DISEASE), LUMBOSACRAL: ICD-10-CM

## 2024-03-13 DIAGNOSIS — M70.71 ISCHIAL BURSITIS OF RIGHT SIDE: Primary | ICD-10-CM

## 2024-03-13 DIAGNOSIS — M54.12 CERVICAL RADICULOPATHY: ICD-10-CM

## 2024-03-13 DIAGNOSIS — M47.816 LUMBAR SPONDYLOSIS: ICD-10-CM

## 2024-03-13 DIAGNOSIS — M47.814 THORACIC SPONDYLOSIS: ICD-10-CM

## 2024-03-13 DIAGNOSIS — M79.18 MYOFASCIAL PAIN: ICD-10-CM

## 2024-03-13 DIAGNOSIS — M47.812 CERVICAL SPONDYLOSIS: ICD-10-CM

## 2024-03-13 DIAGNOSIS — M54.12 CERVICAL RADICULITIS: ICD-10-CM

## 2024-03-13 PROCEDURE — 3075F SYST BP GE 130 - 139MM HG: CPT | Performed by: NURSE PRACTITIONER

## 2024-03-13 PROCEDURE — 99214 OFFICE O/P EST MOD 30 MIN: CPT | Performed by: NURSE PRACTITIONER

## 2024-03-13 PROCEDURE — 3078F DIAST BP <80 MM HG: CPT | Performed by: NURSE PRACTITIONER

## 2024-03-13 NOTE — PROGRESS NOTES
[facility] PROFESSIONAL SERVS  [facility] NEUROSCIENCE AND REHABILITATION [address]  [facility] [address]  Dept: [phone]  Dept Fax: [phone]  Loc: [phone]    Visit Date: 3/13/2024    Functionality Assessment/Goals Worksheet     On a scale of 0 (Does not Interfere) to 10 (Completely Interferes)     1.  Which number describes how during the past week pain has interfered with       the following:  A.  General Activity:  5  B.  Mood: 3  C.  Walking Ability:  6  D.  Normal Work (Includes both work outside the home and housework):  6  E.  Relations with Other People:   3  F.  Sleep:   8  G.  Enjoyment of Life:   5    2.  Patient Prefers to Take their Pain Medications:     [x]  On a regular basis   []  Only when necessary    []  Does not take pain medications    3.  What are the Patient's Goals/Expectations for Visiting Pain Management?     []  Learn about my pain    []  Receive Medication   []  Physical Therapy     []  Treat Depression   [x]  Receive Injections    []  Treat Sleep   []  Deal with Anxiety and Stress   []  Treat Opoid Dependence/Addiction   []  Other:  
also advised to continue physical therapy and stretching exercises at home and cognitive behavioral and/or group therapy.     Referrals:  None    Prescriptions Written This Visit:   None    Follow-up:   4 weeks after injection    It was my pleasure to evaluate Luis Sagastume Sr. today.  I spent over 35 minutes evaluating this patient, reviewing previous notes and images and completing documentation.       BUSHRA Mazariegos - CNP   Interventional Pain Management/PM&R   [facility] [place]

## 2024-03-28 ENCOUNTER — TELEPHONE (OUTPATIENT)
Dept: FAMILY MEDICINE CLINIC | Age: 54
End: 2024-03-28

## 2024-03-28 NOTE — TELEPHONE ENCOUNTER
----- Message from BUSHRA Melgar CNP sent at 1/9/2024  2:30 PM EST -----  Call pt and see if he is wanting scopolamine patch ordered. For cruise

## 2024-04-01 NOTE — DISCHARGE INSTRUCTIONS
rub.  Family and friends who visit you should not touch the surgical wound or dressings.  Family and friends should clean their hands with soap and water or an alcohol-based hand rub before and after visiting you. If you do not see them clean their hands, ask them to clean their hands.    What do I need to do when I go home from the hospital?  Before you go home, your doctor or nurse should explain everything you need to know about taking care of your wound. Make sure you understand how to care for your wound before you leave the hospital.  Always clean your hands before and after caring for your wound.  Before you go home, make sure you know who to contact if you have questions or problems after you get home.  If you have any symptoms of an infection, such as redness and pain at the surgery site, drainage, or fever, call your doctor immediately.    If you have additional questions, please ask your doctor or nurse.

## 2024-04-10 NOTE — H&P
Cooperative, no exaggerated pain behaviors       Assessment:    Diagnosis Orders   1. Ischial bursitis of right side  CHG FLUOR NEEDLE/CATH SPINE/PARASPINAL DX/THER ADDON    DE ARTHROCENTESIS ASPIR&/INJ MAJOR JT/BURSA W/O US      2. Lumbar spondylosis        3. Thoracic spondylosis        4. Cervical spondylosis        5. Cervical radiculopathy        6. Cervical radiculitis        7. DDD (degenerative disc disease), lumbosacral        8. Myofascial pain        9. Chronic pain syndrome              Luis Sagastume Sr. is a 53 y.o.male presenting to the pain clinic for evaluation of right sided neck pain with radiculopathy into the right arm and into mid back.      With significant tenderness along the right ischial bursa, we did discuss right ischial bursitis injection with Dr. Castro under fluoroscopy.  He is agreeable this and we will move forward with this.  As he has had significant pain with injections in the past, we did discuss due to this and his anxiety with injections, we will requesting IV anesthesia.  I did discuss with him he can continue medications from his primary care provider, and that if needed we can take over make adjustments in the future.  At this time he would like to continue with these and discussed possible adjustments in the future or alternative medications.  Once we complete this injection we will then discuss further his multiple pain complaints and start with the next most bothersome area.  I will see him back 4 weeks after injection is completed.    Plan:   The following treatment recommendations and plan were discussed in detail with Luis Sagastume Sr..    Imaging:   I have reviewed patient’s imaging of Cervical and Thoracic MRI, EMG, thoracic CT and results were discussed with patient today.     Analgesics:   The patient is currently managing their pain with the use of Naproxen, Zanaflex which is prescribed by PCP, BUSHRA Melgar. Advised to not take medication on empty

## 2024-04-11 ENCOUNTER — HOSPITAL ENCOUNTER (OUTPATIENT)
Age: 54
Setting detail: OUTPATIENT SURGERY
Discharge: HOME | End: 2024-04-11
Attending: ANESTHESIOLOGY | Admitting: ANESTHESIOLOGY
Payer: MEDICARE

## 2024-04-11 ENCOUNTER — APPOINTMENT (OUTPATIENT)
Dept: GENERAL RADIOLOGY | Age: 54
End: 2024-04-11
Attending: ANESTHESIOLOGY
Payer: MEDICARE

## 2024-04-11 VITALS
SYSTOLIC BLOOD PRESSURE: 117 MMHG | BODY MASS INDEX: 24.82 KG/M2 | TEMPERATURE: 97.4 F | RESPIRATION RATE: 16 BRPM | DIASTOLIC BLOOD PRESSURE: 74 MMHG | HEART RATE: 78 BPM | OXYGEN SATURATION: 98 % | WEIGHT: 167.6 LBS | HEIGHT: 69 IN

## 2024-04-11 PROCEDURE — 7100000010 HC PHASE II RECOVERY - FIRST 15 MIN: Performed by: ANESTHESIOLOGY

## 2024-04-11 PROCEDURE — 77002 NEEDLE LOCALIZATION BY XRAY: CPT | Performed by: ANESTHESIOLOGY

## 2024-04-11 PROCEDURE — 2709999900 HC NON-CHARGEABLE SUPPLY: Performed by: ANESTHESIOLOGY

## 2024-04-11 PROCEDURE — 99152 MOD SED SAME PHYS/QHP 5/>YRS: CPT | Performed by: ANESTHESIOLOGY

## 2024-04-11 PROCEDURE — 7100000011 HC PHASE II RECOVERY - ADDTL 15 MIN: Performed by: ANESTHESIOLOGY

## 2024-04-11 PROCEDURE — 3600000054 HC PAIN LEVEL 3 BASE: Performed by: ANESTHESIOLOGY

## 2024-04-11 PROCEDURE — 20610 DRAIN/INJ JOINT/BURSA W/O US: CPT | Performed by: ANESTHESIOLOGY

## 2024-04-11 PROCEDURE — 6360000004 HC RX CONTRAST MEDICATION: Performed by: ANESTHESIOLOGY

## 2024-04-11 PROCEDURE — 6360000002 HC RX W HCPCS: Performed by: ANESTHESIOLOGY

## 2024-04-11 PROCEDURE — 2500000003 HC RX 250 WO HCPCS: Performed by: ANESTHESIOLOGY

## 2024-04-11 RX ORDER — BUPIVACAINE HYDROCHLORIDE 5 MG/ML
INJECTION, SOLUTION PERINEURAL PRN
Status: DISCONTINUED | OUTPATIENT
Start: 2024-04-11 | End: 2024-04-11 | Stop reason: ALTCHOICE

## 2024-04-11 RX ORDER — METHYLPREDNISOLONE ACETATE 40 MG/ML
INJECTION, SUSPENSION INTRA-ARTICULAR; INTRALESIONAL; INTRAMUSCULAR; SOFT TISSUE PRN
Status: DISCONTINUED | OUTPATIENT
Start: 2024-04-11 | End: 2024-04-11 | Stop reason: ALTCHOICE

## 2024-04-11 RX ORDER — LIDOCAINE HYDROCHLORIDE 10 MG/ML
INJECTION, SOLUTION EPIDURAL; INFILTRATION; INTRACAUDAL; PERINEURAL PRN
Status: DISCONTINUED | OUTPATIENT
Start: 2024-04-11 | End: 2024-04-11 | Stop reason: ALTCHOICE

## 2024-04-11 RX ORDER — MIDAZOLAM HYDROCHLORIDE 1 MG/ML
INJECTION INTRAMUSCULAR; INTRAVENOUS PRN
Status: DISCONTINUED | OUTPATIENT
Start: 2024-04-11 | End: 2024-04-11 | Stop reason: ALTCHOICE

## 2024-04-11 RX ORDER — FENTANYL CITRATE 50 UG/ML
INJECTION, SOLUTION INTRAMUSCULAR; INTRAVENOUS PRN
Status: DISCONTINUED | OUTPATIENT
Start: 2024-04-11 | End: 2024-04-11 | Stop reason: ALTCHOICE

## 2024-04-11 ASSESSMENT — PAIN - FUNCTIONAL ASSESSMENT
PAIN_FUNCTIONAL_ASSESSMENT: 0-10
PAIN_FUNCTIONAL_ASSESSMENT: NONE - DENIES PAIN

## 2024-04-11 ASSESSMENT — PAIN DESCRIPTION - DESCRIPTORS: DESCRIPTORS: ACHING

## 2024-04-11 NOTE — PROGRESS NOTES
Resting quietly in room with call light in reach. Reviewed discharge instructions and voiced understanding.

## 2024-04-11 NOTE — POST SEDATION
[facility]  Sedation/Analgesia Post Sedation Record    Pt Name: Luis Sagastume Sr.  MRN: [id number]  YOB: 1970  Procedure Performed By: Osmar Castro DO  Primary Care Physician: Tereso Castro APRN - CNP    POST-PROCEDURE    Physicians/Assistants: Osmar Castro DO  Procedure Performed: See Procedure Note   Sedation/Anesthesia: Versed and Fentanyl (See procedure note for amount and duration)  Estimated Blood Loss:     0  ml  Specimens Removed: None        Complications: None           Osmar Castro DO  Electronically signed 4/11/2024 at 1:13 PM

## 2024-04-11 NOTE — PRE SEDATION
little chances of survival, for more than 24 hours.  Mallampati I Airway Classification: 2    1. Pre-procedure diagnostic studies complete and results available.  2. Previous sedation/anesthesia experiences assessed.  3. The patient is an appropriate candidate to undergo the planned procedure sedation and anesthesia. (Refer to nursing sedation/analgesia documentation record)  4. Formulation and discussion of sedation/procedure plan, risks, and expectations with patient and/or responsible adult completed.  5. Patient examined immediately prior to the procedure. (Refer to nursing sedation/analgesia documentation record)    Osmar Castro DO  Electronically signed 4/11/2024 at 1:13 PM

## 2024-04-11 NOTE — PROCEDURES
Pre-operative Diagnosis:  Ischial bursitis     Post-operative Diagnosis: Ischial bursitis     Procedure: RIGHT ischial bursa injection(s)     Procedure Description:  After having signed the informed consent, the patient was placed in the prone position.  The patient's back/buttocks was prepped with chloraprep solution, and draped in a sterile fashion. A total of 1 ml of 1% lidocaine were used to anesthetize the skin and underlying tissues.  Under fluoroscopic guidance a single 22-gauge, 3.5 inch spinal needle was advanced to lie at the inferior pole of the RIGHT ischium.  There were no paresthesias or heme aspiration. Needle placement was confirmed in the AP view.  After negative aspiration, 0.5 ml of Omnipaque 300 contrast was injected with appropriate spread observed.  A total of 4 ml of 0.5% bupivicaine mixed with 40 mg depo-medrol were injected into the ischial bursa. The needle was withdrawn without any complications. The patient tolerated the procedure well, was transported to the recovery room and observed for 15 minutes and discharged in an ambulatory fashion. No immediate reported complications.     Procedural Complications: None  Estimated Blood Loss: 0 mL    IV sedation was used during the procedure:  - Moderate intravenous conscious sedation was supervised by Dr. Castro  - The patient was independently monitored by a Registered Nurse assigned to the procedure room  - Monitoring included automated blood pressure, continuous EKG, and continuous pulse oximetry  - The detailed conscious record is permanently stored in the Hospital Information System  - The following is the conscious sedation record:  Start Time: 09:07  End Time : 09:22  Duration: 15 minutes   Medications Administered: 1 mg Versed, 50 mcg Fentanyl          Osmar Castro DO  Interventional Pain Management/PM&R   [facility]

## 2024-04-11 NOTE — PROGRESS NOTES
0912-Patient to Phase II via cart. Report received from Shantal PICKERING. Patient drowsy but responsive.Vitals obtained and stable. Respirations even and unlabored on room air. Patient denies pain, nausea, numbness and tingling. Patient able to move all extremities. No drainage noted at injection sites. Patient instructed to stay in bed. Instructed on call light use.  0915-Patient provided with snack and drink. Denies needs. Call light in reach.  0925-IV removed with no complications. Patent getting dressed in bed. Ride notified of pickup.  0935-Patient meets discharge criteria. Discharged in stable condition. Patient brought to car via wheelchair with assistance from RN. Patient tolerated well. All belongings sent with patient.

## 2024-05-09 ENCOUNTER — TELEPHONE (OUTPATIENT)
Dept: PHYSICAL MEDICINE AND REHAB | Age: 54
End: 2024-05-09

## 2024-05-09 ENCOUNTER — OFFICE VISIT (OUTPATIENT)
Dept: PHYSICAL MEDICINE AND REHAB | Age: 54
End: 2024-05-09
Payer: MEDICARE

## 2024-05-09 VITALS
BODY MASS INDEX: 24.82 KG/M2 | DIASTOLIC BLOOD PRESSURE: 84 MMHG | WEIGHT: 167.55 LBS | SYSTOLIC BLOOD PRESSURE: 134 MMHG | HEIGHT: 69 IN

## 2024-05-09 DIAGNOSIS — G89.4 CHRONIC PAIN SYNDROME: ICD-10-CM

## 2024-05-09 DIAGNOSIS — M47.816 LUMBAR SPONDYLOSIS: ICD-10-CM

## 2024-05-09 DIAGNOSIS — M70.71 ISCHIAL BURSITIS OF RIGHT SIDE: Primary | ICD-10-CM

## 2024-05-09 DIAGNOSIS — M47.814 THORACIC SPONDYLOSIS: ICD-10-CM

## 2024-05-09 DIAGNOSIS — M47.812 CERVICAL SPONDYLOSIS: ICD-10-CM

## 2024-05-09 DIAGNOSIS — M54.12 CERVICAL RADICULITIS: ICD-10-CM

## 2024-05-09 DIAGNOSIS — M79.18 MYOFASCIAL PAIN: ICD-10-CM

## 2024-05-09 DIAGNOSIS — M48.10 DISH (DIFFUSE IDIOPATHIC SKELETAL HYPEROSTOSIS): ICD-10-CM

## 2024-05-09 DIAGNOSIS — M51.37 DDD (DEGENERATIVE DISC DISEASE), LUMBOSACRAL: ICD-10-CM

## 2024-05-09 DIAGNOSIS — M54.12 CERVICAL RADICULOPATHY: ICD-10-CM

## 2024-05-09 PROCEDURE — 3079F DIAST BP 80-89 MM HG: CPT | Performed by: NURSE PRACTITIONER

## 2024-05-09 PROCEDURE — 99214 OFFICE O/P EST MOD 30 MIN: CPT | Performed by: NURSE PRACTITIONER

## 2024-05-09 PROCEDURE — 3075F SYST BP GE 130 - 139MM HG: CPT | Performed by: NURSE PRACTITIONER
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
## 2024-05-20 ENCOUNTER — OFFICE VISIT (OUTPATIENT)
Dept: FAMILY MEDICINE CLINIC | Age: 54
End: 2024-05-20
Payer: MEDICARE

## 2024-05-20 VITALS
OXYGEN SATURATION: 98 % | SYSTOLIC BLOOD PRESSURE: 136 MMHG | BODY MASS INDEX: 23.4 KG/M2 | RESPIRATION RATE: 16 BRPM | HEART RATE: 86 BPM | DIASTOLIC BLOOD PRESSURE: 82 MMHG | HEIGHT: 69 IN | WEIGHT: 158 LBS | TEMPERATURE: 98.2 F

## 2024-05-20 DIAGNOSIS — Z12.5 SCREENING FOR PROSTATE CANCER: ICD-10-CM

## 2024-05-20 DIAGNOSIS — R35.1 NOCTURIA: ICD-10-CM

## 2024-05-20 DIAGNOSIS — I10 ESSENTIAL HYPERTENSION: ICD-10-CM

## 2024-05-20 DIAGNOSIS — Z13.29 SCREENING FOR THYROID DISORDER: ICD-10-CM

## 2024-05-20 DIAGNOSIS — Z00.00 WELCOME TO MEDICARE PREVENTIVE VISIT: Primary | ICD-10-CM

## 2024-05-20 DIAGNOSIS — Z72.0 TOBACCO ABUSE: ICD-10-CM

## 2024-05-20 DIAGNOSIS — Z00.01 ENCOUNTER FOR GENERAL ADULT MEDICAL EXAMINATION WITH ABNORMAL FINDINGS: ICD-10-CM

## 2024-05-20 DIAGNOSIS — Z86.73 HISTORY OF CVA IN ADULTHOOD: ICD-10-CM

## 2024-05-20 DIAGNOSIS — I51.7 MILD RIGHT ATRIAL ENLARGEMENT: ICD-10-CM

## 2024-05-20 PROCEDURE — 99213 OFFICE O/P EST LOW 20 MIN: CPT | Performed by: NURSE PRACTITIONER

## 2024-05-20 PROCEDURE — 3075F SYST BP GE 130 - 139MM HG: CPT | Performed by: NURSE PRACTITIONER

## 2024-05-20 PROCEDURE — 93000 ELECTROCARDIOGRAM COMPLETE: CPT | Performed by: NURSE PRACTITIONER

## 2024-05-20 PROCEDURE — G0402 INITIAL PREVENTIVE EXAM: HCPCS | Performed by: NURSE PRACTITIONER

## 2024-05-20 PROCEDURE — 3079F DIAST BP 80-89 MM HG: CPT | Performed by: NURSE PRACTITIONER

## 2024-05-20 RX ORDER — LOSARTAN POTASSIUM 25 MG/1
25 TABLET ORAL DAILY
Qty: 90 TABLET | Refills: 4 | Status: SHIPPED | OUTPATIENT
Start: 2024-05-20

## 2024-05-20 SDOH — ECONOMIC STABILITY: FOOD INSECURITY: WITHIN THE PAST 12 MONTHS, YOU WORRIED THAT YOUR FOOD WOULD RUN OUT BEFORE YOU GOT MONEY TO BUY MORE.: NEVER TRUE

## 2024-05-20 SDOH — ECONOMIC STABILITY: INCOME INSECURITY: HOW HARD IS IT FOR YOU TO PAY FOR THE VERY BASICS LIKE FOOD, HOUSING, MEDICAL CARE, AND HEATING?: NOT HARD AT ALL

## 2024-05-20 SDOH — ECONOMIC STABILITY: FOOD INSECURITY: WITHIN THE PAST 12 MONTHS, THE FOOD YOU BOUGHT JUST DIDN'T LAST AND YOU DIDN'T HAVE MONEY TO GET MORE.: NEVER TRUE

## 2024-05-20 ASSESSMENT — LIFESTYLE VARIABLES
HOW OFTEN DURING THE LAST YEAR HAVE YOU HAD A FEELING OF GUILT OR REMORSE AFTER DRINKING: NEVER
HAS A RELATIVE, FRIEND, DOCTOR, OR ANOTHER HEALTH PROFESSIONAL EXPRESSED CONCERN ABOUT YOUR DRINKING OR SUGGESTED YOU CUT DOWN: NO
HOW OFTEN DURING THE LAST YEAR HAVE YOU FOUND THAT YOU WERE NOT ABLE TO STOP DRINKING ONCE YOU HAD STARTED: NEVER
HOW OFTEN DURING THE LAST YEAR HAVE YOU BEEN UNABLE TO REMEMBER WHAT HAPPENED THE NIGHT BEFORE BECAUSE YOU HAD BEEN DRINKING: NEVER
HAVE YOU OR SOMEONE ELSE BEEN INJURED AS A RESULT OF YOUR DRINKING: NO
HOW OFTEN DURING THE LAST YEAR HAVE YOU FAILED TO DO WHAT WAS NORMALLY EXPECTED FROM YOU BECAUSE OF DRINKING: NEVER
HOW OFTEN DO YOU HAVE A DRINK CONTAINING ALCOHOL: 2-4 TIMES A MONTH
HOW MANY STANDARD DRINKS CONTAINING ALCOHOL DO YOU HAVE ON A TYPICAL DAY: 1 OR 2
HOW OFTEN DURING THE LAST YEAR HAVE YOU NEEDED AN ALCOHOLIC DRINK FIRST THING IN THE MORNING TO GET YOURSELF GOING AFTER A NIGHT OF HEAVY DRINKING: NEVER

## 2024-05-20 ASSESSMENT — PATIENT HEALTH QUESTIONNAIRE - PHQ9
SUM OF ALL RESPONSES TO PHQ QUESTIONS 1-9: 0
2. FEELING DOWN, DEPRESSED OR HOPELESS: NOT AT ALL
SUM OF ALL RESPONSES TO PHQ QUESTIONS 1-9: 0
SUM OF ALL RESPONSES TO PHQ9 QUESTIONS 1 & 2: 0
SUM OF ALL RESPONSES TO PHQ QUESTIONS 1-9: 0
SUM OF ALL RESPONSES TO PHQ QUESTIONS 1-9: 0
1. LITTLE INTEREST OR PLEASURE IN DOING THINGS: NOT AT ALL

## 2024-05-20 ASSESSMENT — VISUAL ACUITY
OD_CC: 20/25
OS_CC: 20/20

## 2024-05-20 NOTE — PATIENT INSTRUCTIONS
high-salt, high-fat, processed foods.     Read food labels and try to avoid saturated and trans fats. They increase your risk of heart disease by raising cholesterol levels.     Limit the amount of solid fat--butter, margarine, and shortening--you eat. Use olive, peanut, or canola oil when you cook. Bake, broil, and steam foods instead of frying them.     Eat a variety of fruit and vegetables every day. Dark green, deep orange, red, or yellow fruits and vegetables are especially good for you. Examples include spinach, carrots, peaches, and berries.     Foods high in fiber can reduce your cholesterol and provide important vitamins and minerals. High-fiber foods include whole-grain cereals and breads, oatmeal, beans, brown rice, citrus fruits, and apples.     Eat lean proteins. Heart-healthy proteins include seafood, lean meats and poultry, eggs, beans, peas, nuts, seeds, and soy products.     Limit drinks and foods with added sugar. These include candy, desserts, and soda pop.   Heart-healthy lifestyle    If your doctor recommends it, get more exercise. For many people, walking is a good choice. Or you may want to swim, bike, or do other activities. Bit by bit, increase the time you're active every day. Try for at least 30 minutes on most days of the week.     Try to quit or cut back on using tobacco and other nicotine products. This includes smoking and vaping. If you need help quitting, talk to your doctor about stop-smoking programs and medicines. These can increase your chances of quitting for good. Quitting is one of the most important things you can do to protect your heart. It is never too late to quit. Try to avoid secondhand smoke too.     Stay at a weight that's healthy for you. Talk to your doctor if you need help losing weight.     Try to get 7 to 9 hours of sleep each night.     Limit alcohol to 2 drinks a day for men and 1 drink a day for women. Too much alcohol can cause health problems.     Manage

## 2024-05-20 NOTE — PROGRESS NOTES
Medicare Annual Wellness Visit    Luis Sagastume Sr. is here for Medicare AWV (No concerns )    Assessment & Plan   Welcome to Medicare preventive visit  -     TSH With Reflex Ft4; Future  -     PSA Prostatic Specific Antigen; Future  -     Lipid Panel; Future  -     Comprehensive Metabolic Panel; Future  -     EKG 12 lead  Essential hypertension  -at goal continue current meds  -     losartan (COZAAR) 25 MG tablet; Take 1 tablet by mouth daily, Disp-90 tablet, R-4Normal  -     TSH With Reflex Ft4; Future  -     PSA Prostatic Specific Antigen; Future  -     Lipid Panel; Future  -     Comprehensive Metabolic Panel; Future  -     EKG 12 lead  Tobacco abuse  Declines smoking cessation   History of CVA in adulthood  Screening for prostate cancer  -     PSA Prostatic Specific Antigen; Future  Screening for thyroid disorder  -     TSH With Reflex Ft4; Future  Nocturia  R/o BPH  Avoid drinking 3 hours before bed  -     PSA Prostatic Specific Antigen; Future  Mild right atrial enlargement  -abnormal   monitor not symptomatic not Pedal edema   Last echo 2013 normal.  May need to update, to verify  pt will think about it      Recommendations for Preventive Services Due: see orders and patient instructions/AVS.  Recommended screening schedule for the next 5-10 years is provided to the patient in written form: see Patient Instructions/AVS.     Return in about 1 year (around 5/20/2025) for MAW.     Subjective     PMH: HTN, tobacco abuse, Hx CVA, chronic  pain,     The following acute and/or chronic problems were also addressed today:      Gets up a few times a night to urinate     HTN  -controlled with cozaar 25 mg daily norvasc 5 mg daily  -pt denies CP or SOB on exertion or H/a or dizziness or pedal edema     Hx CVA  -mom had a stroke  -states his stroke was in 2013  -has some residual right sided weakness due to this     Smokes less than 1/2 ppd  Declines smoking cessation meds  -wife is also trying to quit     Due for

## 2024-05-24 LAB
ALBUMIN: 4.3 G/DL (ref 3.5–5.2)
ALK PHOSPHATASE: 50 U/L (ref 40–121)
ALT SERPL-CCNC: 34 U/L (ref 5–50)
ANION GAP SERPL CALCULATED.3IONS-SCNC: 9 MEQ/L (ref 7–16)
AST SERPL-CCNC: 39 U/L (ref 9–50)
BILIRUB SERPL-MCNC: 0.3 MG/DL
BUN BLDV-MCNC: 13 MG/DL (ref 6–20)
CALCIUM SERPL-MCNC: 9.5 MG/DL (ref 8.5–10.5)
CHLORIDE BLD-SCNC: 106 MEQ/L (ref 95–107)
CHOLESTEROL, TOTAL: 256 MG/DL
CHOLESTEROL/HDL RATIO: 3.1 RATIO
CO2: 25 MEQ/L (ref 19–31)
CREAT SERPL-MCNC: 1.11 MG/DL (ref 0.8–1.4)
EGFR IF NONAFRICAN AMERICAN: 80 ML/MIN/1.73
GLUCOSE: 94 MG/DL (ref 70–99)
HDLC SERPL-MCNC: 82 MG/DL
LDL CHOLESTEROL: 158 MG/DL
LDL/HDL RATIO: 1.9 RATIO
POTASSIUM SERPL-SCNC: 4.6 MEQ/L (ref 3.5–5.4)
PSA, ULTRASENSITIVE: 4.8 NG/ML
SODIUM BLD-SCNC: 140 MEQ/L (ref 133–146)
TOTAL PROTEIN: 6.5 G/DL (ref 6.1–8.3)
TRIGL SERPL-MCNC: 82 MG/DL
TSH SERPL DL<=0.05 MIU/L-ACNC: 2.93 UIU/ML (ref 0.4–4.1)
VLDLC SERPL CALC-MCNC: 16 MG/DL

## 2024-05-27 DIAGNOSIS — E78.2 MIXED HYPERLIPIDEMIA: Primary | ICD-10-CM

## 2024-05-27 DIAGNOSIS — R97.20 ELEVATED PSA: ICD-10-CM

## 2024-05-27 RX ORDER — ATORVASTATIN CALCIUM 20 MG/1
20 TABLET, FILM COATED ORAL DAILY
Qty: 90 TABLET | Refills: 4 | Status: SHIPPED | OUTPATIENT
Start: 2024-05-27

## 2024-05-28 ENCOUNTER — TELEPHONE (OUTPATIENT)
Dept: FAMILY MEDICINE CLINIC | Age: 54
End: 2024-05-28

## 2024-05-28 DIAGNOSIS — M62.838 MUSCLE SPASM: ICD-10-CM

## 2024-05-28 DIAGNOSIS — M54.2 CERVICALGIA: ICD-10-CM

## 2024-05-28 DIAGNOSIS — I10 ESSENTIAL HYPERTENSION: ICD-10-CM

## 2024-05-28 DIAGNOSIS — S13.4XXD WHIPLASH INJURY TO NECK, SUBSEQUENT ENCOUNTER: ICD-10-CM

## 2024-05-28 RX ORDER — AMLODIPINE BESYLATE 5 MG/1
5 TABLET ORAL DAILY
Qty: 90 TABLET | Refills: 4 | Status: SHIPPED | OUTPATIENT
Start: 2024-05-28

## 2024-05-28 RX ORDER — TIZANIDINE 4 MG/1
TABLET ORAL
Qty: 30 TABLET | Refills: 3 | Status: SHIPPED | OUTPATIENT
Start: 2024-05-28

## 2024-05-28 NOTE — TELEPHONE ENCOUNTER
Recent Visits  Date Type Provider Dept   05/20/24 Office Visit Tereso Castro APRN - CNP [facility]   01/09/24 Office Visit Tereso Castro APRN - CNP [facility]   05/16/23 Office Visit Matthew Tereso, APRN - CNP [facility]   Showing recent visits within past 540 days with a meds authorizing provider and meeting all other requirements  Future Appointments  No visits were found meeting these conditions.  Showing future appointments within next 150 days with a meds authorizing provider and meeting all other requirements

## 2024-05-28 NOTE — TELEPHONE ENCOUNTER
----- Message from BUSHRA Melgar - CNP sent at 5/27/2024  7:07 PM EDT -----  Let pt know his PSA is elevated at 4.80 normal range is up to 1, guidelines recommend a referral pt Urology when levels reach over 3, as a biopsy of the  prostate may be needed. I will place the referral. Total cholesterol  is 256 up from 243, I do recommend pt start a statin to lower cholesterol levels along with daily aerobic activity and a low cholesterol diet . TSH normal, liver and renal function is normal , let me know if he has any questions

## 2024-06-03 ENCOUNTER — OFFICE VISIT (OUTPATIENT)
Dept: UROLOGY | Age: 54
End: 2024-06-03
Payer: MEDICARE

## 2024-06-03 VITALS
HEIGHT: 69 IN | DIASTOLIC BLOOD PRESSURE: 70 MMHG | WEIGHT: 158 LBS | SYSTOLIC BLOOD PRESSURE: 134 MMHG | BODY MASS INDEX: 23.4 KG/M2

## 2024-06-03 DIAGNOSIS — R97.20 ELEVATED PSA: Primary | ICD-10-CM

## 2024-06-03 LAB
BILIRUBIN, URINE: NEGATIVE
BLOOD URINE, POC: NEGATIVE
CHARACTER, URINE: CLEAR
COLOR: ABNORMAL
GLUCOSE URINE: NEGATIVE MG/DL
KETONES, URINE: NEGATIVE
LEUKOCYTE CLUMPS, URINE: NEGATIVE
NITRITE, URINE: NEGATIVE
PH, URINE: 5.5 (ref 5–9)
PROTEIN, URINE: NEGATIVE MG/DL
SPECIFIC GRAVITY UA: >= 1.03 (ref 1–1.03)
UROBILINOGEN, URINE: 0.2 EU/DL (ref 0–1)

## 2024-06-03 PROCEDURE — 99203 OFFICE O/P NEW LOW 30 MIN: CPT | Performed by: NURSE PRACTITIONER

## 2024-06-03 PROCEDURE — 3078F DIAST BP <80 MM HG: CPT | Performed by: NURSE PRACTITIONER

## 2024-06-03 PROCEDURE — 3075F SYST BP GE 130 - 139MM HG: CPT | Performed by: NURSE PRACTITIONER

## 2024-06-03 PROCEDURE — 81003 URINALYSIS AUTO W/O SCOPE: CPT | Performed by: NURSE PRACTITIONER

## 2024-06-03 ASSESSMENT — ENCOUNTER SYMPTOMS
NAUSEA: 0
BACK PAIN: 0
VOMITING: 0
ABDOMINAL PAIN: 0
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
## 2024-06-20 NOTE — DISCHARGE INSTRUCTIONS
Post procedure Instructions:    No driving or making significant decisions for the remainder of the day.   Be cautions with walking and activity for 24 hours, do not over exert yourself.  Ok to resume pre-procedure activity level today.  Apply ice to site of injection site if you have pain or discomfort.  Do not submerge sit of injection during bath or pool activities for 48 hours post-procedure.  Resume blood thinning medications in 24 hours.     Call office [phone] if you have:  Temperature greater than 100.4  Persistent nausea and vomiting  Severe uncontrolled pain  Redness, tenderness, or signs of infection (pain, swelling, redness, odor or green/yellow discharge around the site)  Difficulty breathing, headache or visual disturbances  Hives  Persistent dizziness or light-headedness  Extreme fatigue  Any other questions or concerns you may have after discharge    In an emergency, call 911 or go to an Emergency Department at a nearby hospital    “Surgical Site Infections”      How can we work together to prevent Surgical Site Infections?   We would like to thank you for choosing [facility] for your Surgical Care.  Below you will find helpful information on how we can work together to prevent Surgical Site Infections.    What is a Surgical Site Infection (SSI)?  A surgical site infection is an infection that occurs after surgery in the part of the body where the surgery took place. Most patients who have surgery do not develop an infection. However, infections develop in about 1 to 3 out of every 100 patients who have surgery.  Some of the common symptoms of a surgical site infection are:  Redness and pain around the area where you had surgery  Drainage of cloudy fluid from your surgical wound  Fever    Can SSIs be treated?  Yes. Most surgical site infections can be treated with antibiotics. The antibiotic given to you depends on the bacteria (germs) causing the infection. Sometimes patients with

## 2024-06-23 NOTE — H&P
spondylosis        3. DISH (diffuse idiopathic skeletal hyperostosis)        4. Lumbar spondylosis        5. Cervical spondylosis        6. Cervical radiculopathy        7. Cervical radiculitis        8. DDD (degenerative disc disease), lumbosacral        9. Myofascial pain        10. Chronic pain syndrome            Luis Sagastume Sr. is a 53 y.o.male presenting to the pain clinic for evaluation of right sided neck pain with radiculopathy into the right arm and into mid back.      With significant benefit from initial bursa injection, but limited time of relief, we did discuss alternate injection therapy to help assist with pain control for longer term relief.  He is agreeable this.  I have set him up for a right ischial bursa radiofrequency ablation with Dr. Castro under fluoroscopy.  We also did discuss he has multiple other pain complaints, and with concerns of findings of DISH on his imaging, we did discuss continuing NSAIDs, home exercise program and stretches.  I did discuss with him also potentially getting rheumatological panel completed for any other underlying concerns.  He would like to wait at this time and discuss it with his significant other and potentially get completed in the future.  I discussed with him he can continue current naproxen and Zanaflex as ordered by his PCP, but if he would like to trial alternatives, he can let me know and I will take over make adjustments in the future.  At this time we will continue with injection therapy, radiofrequency ablation I would like to see him back 4 weeks after ablation is completed.    Plan:   The following treatment recommendations and plan were discussed in detail with Luis Sagastume Sr..    Imaging:   I have reviewed patient’s imaging of Cervical and Thoracic MRI, EMG, thoracic CT and results were discussed with patient today.     Analgesics:   The patient is currently managing their pain with the use of Naproxen, Zanaflex which is

## 2024-06-24 ENCOUNTER — TELEPHONE (OUTPATIENT)
Dept: PHYSICAL MEDICINE AND REHAB | Age: 54
End: 2024-06-24

## 2024-06-24 NOTE — TELEPHONE ENCOUNTER
Called pt to confirm time of procedure on 6/25/24. Pt's mailbox was full, unable to leave a message.

## 2024-06-25 ENCOUNTER — APPOINTMENT (OUTPATIENT)
Dept: GENERAL RADIOLOGY | Age: 54
End: 2024-06-25
Attending: ANESTHESIOLOGY
Payer: MEDICARE

## 2024-06-25 ENCOUNTER — HOSPITAL ENCOUNTER (OUTPATIENT)
Age: 54
Setting detail: OUTPATIENT SURGERY
Discharge: HOME | End: 2024-06-25
Attending: ANESTHESIOLOGY | Admitting: ANESTHESIOLOGY
Payer: MEDICARE

## 2024-06-25 VITALS
TEMPERATURE: 98.5 F | OXYGEN SATURATION: 97 % | RESPIRATION RATE: 15 BRPM | HEART RATE: 81 BPM | SYSTOLIC BLOOD PRESSURE: 139 MMHG | BODY MASS INDEX: 23.11 KG/M2 | WEIGHT: 156 LBS | DIASTOLIC BLOOD PRESSURE: 88 MMHG | HEIGHT: 69 IN

## 2024-06-25 PROCEDURE — 7100000011 HC PHASE II RECOVERY - ADDTL 15 MIN: Performed by: ANESTHESIOLOGY

## 2024-06-25 PROCEDURE — 2500000003 HC RX 250 WO HCPCS: Performed by: ANESTHESIOLOGY

## 2024-06-25 PROCEDURE — 3600000054 HC PAIN LEVEL 3 BASE: Performed by: ANESTHESIOLOGY

## 2024-06-25 PROCEDURE — 6360000002 HC RX W HCPCS: Performed by: ANESTHESIOLOGY

## 2024-06-25 PROCEDURE — 2709999900 HC NON-CHARGEABLE SUPPLY: Performed by: ANESTHESIOLOGY

## 2024-06-25 PROCEDURE — 64640 INJECTION TREATMENT OF NERVE: CPT | Performed by: ANESTHESIOLOGY

## 2024-06-25 PROCEDURE — 99152 MOD SED SAME PHYS/QHP 5/>YRS: CPT | Performed by: ANESTHESIOLOGY

## 2024-06-25 PROCEDURE — 7100000010 HC PHASE II RECOVERY - FIRST 15 MIN: Performed by: ANESTHESIOLOGY

## 2024-06-25 RX ORDER — FENTANYL CITRATE 50 UG/ML
INJECTION, SOLUTION INTRAMUSCULAR; INTRAVENOUS PRN
Status: DISCONTINUED | OUTPATIENT
Start: 2024-06-25 | End: 2024-06-25 | Stop reason: ALTCHOICE

## 2024-06-25 RX ORDER — LIDOCAINE HYDROCHLORIDE 20 MG/ML
INJECTION, SOLUTION EPIDURAL; INFILTRATION; INTRACAUDAL; PERINEURAL PRN
Status: DISCONTINUED | OUTPATIENT
Start: 2024-06-25 | End: 2024-06-25 | Stop reason: ALTCHOICE

## 2024-06-25 RX ORDER — LIDOCAINE HYDROCHLORIDE 10 MG/ML
INJECTION, SOLUTION EPIDURAL; INFILTRATION; INTRACAUDAL; PERINEURAL PRN
Status: DISCONTINUED | OUTPATIENT
Start: 2024-06-25 | End: 2024-06-25 | Stop reason: ALTCHOICE

## 2024-06-25 RX ORDER — MIDAZOLAM HYDROCHLORIDE 1 MG/ML
INJECTION INTRAMUSCULAR; INTRAVENOUS PRN
Status: DISCONTINUED | OUTPATIENT
Start: 2024-06-25 | End: 2024-06-25 | Stop reason: ALTCHOICE

## 2024-06-25 RX ORDER — METHYLPREDNISOLONE ACETATE 40 MG/ML
INJECTION, SUSPENSION INTRA-ARTICULAR; INTRALESIONAL; INTRAMUSCULAR; SOFT TISSUE PRN
Status: DISCONTINUED | OUTPATIENT
Start: 2024-06-25 | End: 2024-06-25 | Stop reason: ALTCHOICE

## 2024-06-25 ASSESSMENT — PAIN - FUNCTIONAL ASSESSMENT
PAIN_FUNCTIONAL_ASSESSMENT: 0-10
PAIN_FUNCTIONAL_ASSESSMENT: 0-10

## 2024-06-25 ASSESSMENT — PAIN DESCRIPTION - DESCRIPTORS: DESCRIPTORS: BURNING

## 2024-06-25 NOTE — PROCEDURES
Pre-operative Diagnosis: RIGHT ischial bursitis     Post-operative Diagnosis: RIGHT ischial bursitis     Procedure: RIGHT ischial bursa radiofrequency ablation     Procedure Description:  After consent was obtained, the patient was placed in the prone position. The low  back was prepped with chloraprep and draped in a sterile fashion.  Then, 0.5 cc of 1 % lidocaine was used for local anesthesia of the skin and superficial subcutaneous tissues.  Two 20-gauge 100mm SMK cannulas with 10-mm active tips were advanced under fluoroscopic guidance in an AP view to the straddle the RIGHT ischial bursa. There were no paresthesias, heme or CSF obtained.  Needle placement was confirmed using AP and lateral views.     Sensory and motor stimulation at 50Hz and 2Hz and impedance measurements were carried out having reached threshold at:     1: 0.2V/3V/150-300 Ohms  2: 0.2V/3V/150-300 Ohms        Then, 1cc of 2% Lidocaine was injected at the site. Temperature was then raised to 80 degrees centigrade for 90 seconds with a 15 second temperature ramp. No pain was reported during the lesioning. 20 mg depo-medrol was injected at each site. The needles were then withdrawn without complications. The patient tolerated the procedure well. The patient was transported to the recovery room and discharged in ambulatory fashion.     Procedural Complications: None  Estimated Blood Loss: 0 mL     IV sedation was used during the procedure:  - Moderate intravenous conscious sedation was supervised by Dr. Castro  - The patient was independently monitored by a Registered Nurse assigned to the procedure room  - Monitoring included automated blood pressure, continuous EKG, and continuous pulse oximetry  - The detailed conscious record is permanently stored in the Hospital Information System  - The following is the conscious sedation record:  Start Time: 08:17  End Time : 08:32  Duration: 15 minutes   Medications Administered: 2 mg Versed, 100 mcg

## 2024-06-25 NOTE — POST SEDATION
[facility]  Sedation/Analgesia Post Sedation Record    Pt Name: Luis Sagastume Sr.  MRN: [id number]  YOB: 1970  Procedure Performed By: Osmar Castro DO  Primary Care Physician: Tereso Castro APRN - CNP    POST-PROCEDURE    Physicians/Assistants: Osmar Castro DO  Procedure Performed: See Procedure Note   Sedation/Anesthesia: Versed and Fentanyl (See procedure note for amount and duration)  Estimated Blood Loss:     0  ml  Specimens Removed: None        Complications: None           Osmar Castro DO  Electronically signed 6/25/2024 at 1:32 PM

## 2024-06-25 NOTE — PROGRESS NOTES
0828 - Patient arrived to Phase II via bed, report from Suzie PICKERING. Patient awake and alert without any complaints. VSS. Site WNL. Patient positioned for comfort, drink and snack provided. Call light in reach.  0855 - IV removed. Patient sat edge of bed, tolerated well. Patient dressed.  0858 - Patient discharged ambulatory to private vehicle with wife.

## 2024-07-01 RX ORDER — NAPROXEN 375 MG/1
TABLET ORAL
Qty: 180 TABLET | Refills: 1 | Status: SHIPPED | OUTPATIENT
Start: 2024-07-01

## 2024-07-16 LAB — PSA, ULTRASENSITIVE: 4.52 NG/ML

## 2024-07-18 ENCOUNTER — TELEPHONE (OUTPATIENT)
Dept: UROLOGY | Age: 54
End: 2024-07-18

## 2024-07-18 ENCOUNTER — OFFICE VISIT (OUTPATIENT)
Dept: UROLOGY | Age: 54
End: 2024-07-18
Payer: MEDICARE

## 2024-07-18 VITALS
SYSTOLIC BLOOD PRESSURE: 122 MMHG | DIASTOLIC BLOOD PRESSURE: 84 MMHG | BODY MASS INDEX: 23.55 KG/M2 | HEIGHT: 69 IN | WEIGHT: 159 LBS

## 2024-07-18 DIAGNOSIS — R97.20 ELEVATED PSA: Primary | ICD-10-CM

## 2024-07-18 PROCEDURE — 3079F DIAST BP 80-89 MM HG: CPT | Performed by: NURSE PRACTITIONER

## 2024-07-18 PROCEDURE — 99213 OFFICE O/P EST LOW 20 MIN: CPT | Performed by: NURSE PRACTITIONER

## 2024-07-18 PROCEDURE — 3074F SYST BP LT 130 MM HG: CPT | Performed by: NURSE PRACTITIONER

## 2024-07-18 ASSESSMENT — ENCOUNTER SYMPTOMS
ABDOMINAL PAIN: 0
BACK PAIN: 0
VOMITING: 0
NAUSEA: 0

## 2024-07-18 NOTE — TELEPHONE ENCOUNTER
Patient scheduled for MRI PROSTATE W WO  at [facility] on 8/12/24.  Arrival of 930 AM for a 10 AM scan time.  Order mailed with instructions or given to the patient in the office

## 2024-07-18 NOTE — PROGRESS NOTES
history includes Cancer in his maternal grandmother and mother; Heart Disease in his maternal grandmother and mother.    Social History  Luis  reports that he has been smoking cigarettes. He has a 15.0 pack-year smoking history. He has never used smokeless tobacco. He reports current alcohol use. He reports that he does not use drugs.      Subjective:      Review of Systems   Constitutional:  Negative for activity change, appetite change, chills, diaphoresis, fatigue, fever and unexpected weight change.   Gastrointestinal:  Negative for abdominal pain, nausea and vomiting.   Genitourinary:  Negative for decreased urine volume, difficulty urinating, dysuria, flank pain, frequency, hematuria and urgency.   Musculoskeletal:  Negative for back pain.       Objective:   /84   Ht 1.753 m (5' 9.02\")   Wt 72.1 kg (159 lb)   BMI 23.47 kg/m²     Physical Exam  Vitals reviewed.   Constitutional:       General: He is not in acute distress.     Appearance: Normal appearance. He is well-developed. He is not ill-appearing or diaphoretic.   HENT:      Head: Normocephalic and atraumatic.      Right Ear: External ear normal.      Left Ear: External ear normal.      Nose: Nose normal.      Mouth/Throat:      Mouth: Mucous membranes are moist.   Eyes:      General: No scleral icterus.        Right eye: No discharge.         Left eye: No discharge.   Neck:      Vascular: No JVD.      Trachea: No tracheal deviation.   Pulmonary:      Effort: Pulmonary effort is normal. No respiratory distress.   Musculoskeletal:         General: Normal range of motion.   Neurological:      Mental Status: He is alert and oriented to person, place, and time. Mental status is at baseline.   Psychiatric:         Mood and Affect: Mood normal.         Behavior: Behavior normal.         Thought Content: Thought content normal.         POC  No results found for this visit on 07/18/24.      Patients recent PSA values are as follows  No results found for:

## 2024-07-23 ENCOUNTER — OFFICE VISIT (OUTPATIENT)
Dept: PHYSICAL MEDICINE AND REHAB | Age: 54
End: 2024-07-23
Payer: MEDICARE

## 2024-07-23 VITALS
DIASTOLIC BLOOD PRESSURE: 80 MMHG | BODY MASS INDEX: 23.55 KG/M2 | SYSTOLIC BLOOD PRESSURE: 124 MMHG | HEIGHT: 69 IN | WEIGHT: 159 LBS

## 2024-07-23 DIAGNOSIS — M47.816 LUMBAR SPONDYLOSIS: ICD-10-CM

## 2024-07-23 DIAGNOSIS — M47.814 THORACIC SPONDYLOSIS: ICD-10-CM

## 2024-07-23 DIAGNOSIS — G89.4 CHRONIC PAIN SYNDROME: ICD-10-CM

## 2024-07-23 DIAGNOSIS — M70.71 ISCHIAL BURSITIS OF RIGHT SIDE: Primary | ICD-10-CM

## 2024-07-23 DIAGNOSIS — M54.12 CERVICAL RADICULOPATHY: ICD-10-CM

## 2024-07-23 DIAGNOSIS — M48.10 DISH (DIFFUSE IDIOPATHIC SKELETAL HYPEROSTOSIS): ICD-10-CM

## 2024-07-23 DIAGNOSIS — M54.12 CERVICAL RADICULITIS: ICD-10-CM

## 2024-07-23 DIAGNOSIS — M47.812 CERVICAL SPONDYLOSIS: ICD-10-CM

## 2024-07-23 DIAGNOSIS — M51.37 DDD (DEGENERATIVE DISC DISEASE), LUMBOSACRAL: ICD-10-CM

## 2024-07-23 PROCEDURE — 99214 OFFICE O/P EST MOD 30 MIN: CPT | Performed by: NURSE PRACTITIONER

## 2024-07-23 PROCEDURE — 3074F SYST BP LT 130 MM HG: CPT | Performed by: NURSE PRACTITIONER

## 2024-07-23 PROCEDURE — 3079F DIAST BP 80-89 MM HG: CPT | Performed by: NURSE PRACTITIONER

## 2024-07-23 RX ORDER — NAPROXEN 500 MG/1
500 TABLET ORAL 2 TIMES DAILY WITH MEALS
Qty: 30 TABLET | Refills: 0 | Status: SHIPPED | OUTPATIENT
Start: 2024-07-23

## 2024-07-23 NOTE — PROGRESS NOTES
Functionality Assessment/Goals Worksheet     On a scale of 0 (Does not Interfere) to 10 (Completely Interferes)     1.  Which number describes how during the past week pain has interfered with           the following:  A.  General Activity:  3  B.  Mood: 2  C.  Walking Ability:  6  D.  Normal Work (Includes both work outside the home and housework):  5  E.  Relations with Other People:   0  F.  Sleep:   7  G.  Enjoyment of Life:   4    2.  Patient Prefers to Take their Pain Medications:     []  On a regular basis   [x]  Only when necessary    []  Does not take pain medications    3.  What are the Patient's Goals/Expectations for Visiting Pain Management?     []  Learn about my pain    []  Receive Medication   []  Physical Therapy     []  Treat Depression   []  Receive Injections    [x]  Treat Sleep   []  Deal with Anxiety and Stress   []  Treat Opoid Dependence/Addiction   [x]  Other:

## 2024-07-23 NOTE — PROGRESS NOTES
Chronic Pain/PM&R Clinic Note     Encounter Date: 7/23/24    Subjective:   Chief Complaint:   Chief Complaint   Patient presents with    Follow-up     Procedure follow up      History of Present Illness:   Luis Sagastume Sr. is a 53 y.o. male seen in the clinic initially on 07/23/24 upon request from BUSHRA Melgar for his history of cervical pain. He has a PMHx of stroke in 2013 with no lasting effects, HTN. He also reports he has right sided carpal tunnel syndrome but is not having anything done at this time due to other concerns. Patient reports that neck pain has been occurring since 2008, he denies any accident or trauma that caused the pain. He says he thought he had a crick in his neck and pain just got worse. Pain radiates from right side of neck into his right arm to the fingertips. Denies pain radiating into the left arm. Pain interferes with his sleep, feels tight and spasms at times. Describes the pain as sharp, shooting from the neck down to mid back, and down right arm. Pins and needles, numb and tingling in right arm with weakness. He does have some mid back pain, states is feelslike its \"grabbing\", ache constant. He did go to physical therapy for a few sessions, he reports that it helped when he would do it but did not last. He states they used a TENS unit at PT with relief, massage chair with relief. Has used heat to area with mild relief. He is not working currently, trying to get on disability. 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 Pain is worse at night and first thing in the am.Pain scale : at worst pain is 10/10, at best pain is 4/10.     Today, 4/9/2024, patient presents for planned procedural follow-up.  Patient completed right ischial bursa injection with Dr. Castro on 4/11/2024.  He reports that procedure went very well, and he did a lot better with utilizing IV medication to help keep him relaxed and calm during procedure.

## 2024-08-12 ENCOUNTER — HOSPITAL ENCOUNTER (OUTPATIENT)
Dept: MRI IMAGING | Age: 54
Discharge: HOME | End: 2024-08-12
Payer: MEDICARE

## 2024-08-12 DIAGNOSIS — R97.20 ELEVATED PSA: ICD-10-CM

## 2024-08-12 LAB
BASOPHILS ABSOLUTE: 0.03 K/UL (ref 0–0.2)
BASOPHILS RELATIVE PERCENT: 0.6 %
C-REACTIVE PROTEIN: <0.3 MG/DL
EOSINOPHILS ABSOLUTE: 0.13 K/UL (ref 0–0.5)
EOSINOPHILS RELATIVE PERCENT: 2.5 %
HCT VFR BLD CALC: 39.1 % (ref 40–51)
HEMOGLOBIN: 12.9 G/DL (ref 13.5–17)
IMMATURE GRANS (ABS): 0.01
IMMATURE GRANULOCYTES %: 0.2 %
LYMPHOCYTES ABSOLUTE: 3.24 K/UL (ref 1–4)
LYMPHOCYTES RELATIVE PERCENT: 61.6 %
MCH RBC QN AUTO: 30.8 PG (ref 25–33)
MCHC RBC AUTO-ENTMCNC: 33 G/DL (ref 31–36)
MCV RBC AUTO: 93.3 FL (ref 80–99)
MONOCYTES ABSOLUTE: 0.49 K/UL (ref 0.2–1)
MONOCYTES RELATIVE PERCENT: 9.3 %
NEUTROPHILS ABSOLUTE: 1.36 K/UL (ref 1.5–7.5)
NEUTROPHILS RELATIVE PERCENT: 25.8 %
PDW BLD-RTO: 17.1 % (ref 11.5–15)
PLATELET # BLD: 206 K/UL (ref 130–400)
PMV BLD AUTO: 9.1 FL (ref 9.3–13)
RBC # BLD: 4.19 M/UL (ref 4.5–6.1)
RHEUMATOID FACTOR: 10 IU/ML
SED RATE, AUTOMATED: 8 MM/HR (ref 0–15)
WBC # BLD: 5.3 K/UL (ref 3.5–11)

## 2024-08-12 PROCEDURE — 6360000004 HC RX CONTRAST MEDICATION: Performed by: NURSE PRACTITIONER

## 2024-08-12 PROCEDURE — A9579 GAD-BASE MR CONTRAST NOS,1ML: HCPCS | Performed by: NURSE PRACTITIONER

## 2024-08-12 PROCEDURE — 76377 3D RENDER W/INTRP POSTPROCES: CPT

## 2024-08-12 RX ADMIN — GADOTERIDOL 15 ML: 279.3 INJECTION, SOLUTION INTRAVENOUS at 11:19

## 2024-08-15 LAB
ANA PATTERN: NORMAL
ANTI-NUCLEAR ANTIBODY (ANA): NEGATIVE
CENTROMERE PATTERN: NEGATIVE TITER
COARSE SPECKLED PATTERN: NEGATIVE TITER
COMMENTS: NORMAL
CYTO RETICULAR (MITO) PATTERN: NEGATIVE
DENSE FINE SPECKLED PATTERN: NEGATIVE TITER
DISCRETE NUCLEAR DOTS PATTERN: NEGATIVE TITER
HOMOGENEOUS PATTERN: NEGATIVE TITER
METHOD: NORMAL
NUCLEAR MEMBRANE PATTERN: NEGATIVE TITER
NUCLEOLAR PATTERN: NEGATIVE TITER
SPECKLED PATTERN: NEGATIVE TITER

## 2024-08-20 ENCOUNTER — OFFICE VISIT (OUTPATIENT)
Dept: UROLOGY | Age: 54
End: 2024-08-20
Payer: MEDICARE

## 2024-08-20 ENCOUNTER — OFFICE VISIT (OUTPATIENT)
Dept: PHYSICAL MEDICINE AND REHAB | Age: 54
End: 2024-08-20
Payer: MEDICARE

## 2024-08-20 ENCOUNTER — TELEPHONE (OUTPATIENT)
Dept: UROLOGY | Age: 54
End: 2024-08-20

## 2024-08-20 VITALS
HEIGHT: 69 IN | WEIGHT: 159 LBS | SYSTOLIC BLOOD PRESSURE: 134 MMHG | DIASTOLIC BLOOD PRESSURE: 88 MMHG | BODY MASS INDEX: 23.55 KG/M2

## 2024-08-20 VITALS — SYSTOLIC BLOOD PRESSURE: 132 MMHG | DIASTOLIC BLOOD PRESSURE: 84 MMHG | WEIGHT: 159 LBS | BODY MASS INDEX: 23.47 KG/M2

## 2024-08-20 DIAGNOSIS — M70.71 ISCHIAL BURSITIS OF RIGHT SIDE: ICD-10-CM

## 2024-08-20 DIAGNOSIS — M79.18 MYOFASCIAL PAIN: ICD-10-CM

## 2024-08-20 DIAGNOSIS — M48.10 DISH (DIFFUSE IDIOPATHIC SKELETAL HYPEROSTOSIS): Primary | ICD-10-CM

## 2024-08-20 DIAGNOSIS — M47.812 CERVICAL SPONDYLOSIS: ICD-10-CM

## 2024-08-20 DIAGNOSIS — M47.814 THORACIC SPONDYLOSIS: ICD-10-CM

## 2024-08-20 DIAGNOSIS — M47.816 LUMBAR SPONDYLOSIS: ICD-10-CM

## 2024-08-20 DIAGNOSIS — M54.12 CERVICAL RADICULOPATHY: ICD-10-CM

## 2024-08-20 DIAGNOSIS — M51.37 DDD (DEGENERATIVE DISC DISEASE), LUMBOSACRAL: ICD-10-CM

## 2024-08-20 DIAGNOSIS — R97.20 ELEVATED PSA: Primary | ICD-10-CM

## 2024-08-20 DIAGNOSIS — G89.4 CHRONIC PAIN SYNDROME: ICD-10-CM

## 2024-08-20 PROCEDURE — 99214 OFFICE O/P EST MOD 30 MIN: CPT | Performed by: NURSE PRACTITIONER

## 2024-08-20 PROCEDURE — 3075F SYST BP GE 130 - 139MM HG: CPT | Performed by: NURSE PRACTITIONER

## 2024-08-20 PROCEDURE — 3079F DIAST BP 80-89 MM HG: CPT | Performed by: NURSE PRACTITIONER

## 2024-08-20 RX ORDER — CIPROFLOXACIN 500 MG/1
500 TABLET, FILM COATED ORAL 2 TIMES DAILY
Qty: 6 TABLET | Refills: 0 | Status: SHIPPED | OUTPATIENT
Start: 2024-08-20 | End: 2024-08-23

## 2024-08-20 NOTE — TELEPHONE ENCOUNTER
PROSTATE ULTRASOUND/BIOPSY WITH DR KAREN Sagastume Sr.      1970    You are scheduled for the above procedure on:    9/9/24   at:    8:15 AM  Your follow up appointment for your biopsy results is on:    9/23/24     At:   3:15 PM    Please note that you will be responsible for any charges that are not paid by your insurance.  The prostate biopsy specimens are sent to a Lab and their charges are billed to you separate from the office charges.      DESCRIPTION OF PROSTATIC ULTRASOUND AND BIOPSY  Ultrasound uses harmless sound waves to give us pictures of the prostate, and allows us to accurately guide a biopsy needle to areas of concern.  The procedure is done in the office.  Initially, a complete prostate exam is done.  Next the ultrasound probe, finger-like in size and shape, is placed into the rectum.  With slight movement of the probe, many different views are obtained.  A prostate block may or may not be given at this time.  A spring loaded fine needle is place through the probe and directed into the prostate.  Six or more biopsies are usually taken.  These biopsies are not usually painful.  The entire exam takes 20-30 minutes.    POSSIBLE RISKS OF THE PROCEDURE  Blood may be noted in the stool and urine for a few days.  Blood may be seen in the semen for up to a month.  Infection of the prostate or in the urine can occur even with antibiotic preparation.  You should call if you develop fever, chills, severe pain, or have continuous or significant bleeding.    If you have known hemorrhoids, you may have more bleeding and discomfort in the rectal area following the biopsy.  This may last for 3-5 days afterwards.  If any concerns arise, please call the office.    PREPARATION** PLEASE EAT A REGULAR LUNCH OR BREAKFAST**    1.  DO NOT TAKE: ASPIRIN, MOTRIN,  IBUPROFEN, MOBIC/ MELOXICAM, COUMADIN( WARFARIN) FISH OIL, GARLIC AND VITAMIN E FOR 5 DAYS BEFORE THE BIOPSY AND 3 DAYS AFTER THE BIOPSY.  YOU
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
## 2024-08-27 ENCOUNTER — APPOINTMENT (OUTPATIENT)
Dept: GENERAL RADIOLOGY | Age: 54
End: 2024-08-27
Payer: OTHER MISCELLANEOUS

## 2024-08-27 ENCOUNTER — HOSPITAL ENCOUNTER (EMERGENCY)
Age: 54
Discharge: HOME | End: 2024-08-27
Attending: EMERGENCY MEDICINE
Payer: OTHER MISCELLANEOUS

## 2024-08-27 ENCOUNTER — APPOINTMENT (OUTPATIENT)
Dept: CT IMAGING | Age: 54
End: 2024-08-27
Payer: OTHER MISCELLANEOUS

## 2024-08-27 VITALS
DIASTOLIC BLOOD PRESSURE: 83 MMHG | HEART RATE: 95 BPM | SYSTOLIC BLOOD PRESSURE: 129 MMHG | OXYGEN SATURATION: 98 % | RESPIRATION RATE: 15 BRPM | TEMPERATURE: 99.2 F

## 2024-08-27 DIAGNOSIS — S16.1XXA ACUTE STRAIN OF NECK MUSCLE, INITIAL ENCOUNTER: ICD-10-CM
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
## 2024-08-27 LAB
ABO: NORMAL
ALBUMIN SERPL BCG-MCNC: 4.5 G/DL (ref 3.5–5.1)
ALP SERPL-CCNC: 49 U/L (ref 38–126)
ALT SERPL W/O P-5'-P-CCNC: 30 U/L (ref 11–66)
ANION GAP SERPL CALC-SCNC: 18 MEQ/L (ref 8–16)
ANTIBODY SCREEN: NORMAL
APTT PPP: 27.6 SECONDS (ref 22–38)
AST SERPL-CCNC: 28 U/L (ref 5–40)
BILIRUB CONJ SERPL-MCNC: < 0.1 MG/DL (ref 0.1–13.8)
BILIRUB SERPL-MCNC: 0.5 MG/DL (ref 0.3–1.2)
BUN SERPL-MCNC: 17 MG/DL (ref 7–22)
CHLORIDE SERPL-SCNC: 100 MEQ/L (ref 98–111)
CO2 SERPL-SCNC: 18 MEQ/L (ref 23–33)
CREAT SERPL-MCNC: 1.4 MG/DL (ref 0.4–1.2)
DEPRECATED RDW RBC AUTO: 52.7 FL (ref 35–45)
ERYTHROCYTE [DISTWIDTH] IN BLOOD BY AUTOMATED COUNT: 15.9 % (ref 11.5–14.5)
ETHANOL SERPL-MCNC: 0.22 % (ref 0–0.08)
GFR SERPL CREATININE-BSD FRML MDRD: 60 ML/MIN/1.73M2
GLUCOSE SERPL-MCNC: 94 MG/DL (ref 70–108)
HCT VFR BLD AUTO: 35.7 % (ref 42–52)
HGB BLD-MCNC: 12.4 GM/DL (ref 14–18)
INR PPP: 0.94 (ref 0.85–1.13)
MCH RBC QN AUTO: 31.1 PG (ref 26–33)
MCHC RBC AUTO-ENTMCNC: 34.7 GM/DL (ref 32.2–35.5)
MCV RBC AUTO: 89.5 FL (ref 80–94)
OSMOLALITY SERPL CALC.SUM OF ELEC: 273.3 MOSMOL/KG (ref 275–300)
PLATELET # BLD AUTO: 223 THOU/MM3 (ref 130–400)
PMV BLD AUTO: 8.3 FL (ref 9.4–12.4)
POTASSIUM SERPL-SCNC: 3.7 MEQ/L (ref 3.5–5.2)
PROT SERPL-MCNC: 7 G/DL (ref 6.1–8)
RBC # BLD AUTO: 3.99 MILL/MM3 (ref 4.7–6.1)
RH FACTOR: NORMAL
SODIUM SERPL-SCNC: 136 MEQ/L (ref 135–145)
WBC # BLD AUTO: 6.7 THOU/MM3 (ref 4.8–10.8)

## 2024-08-27 PROCEDURE — 85730 THROMBOPLASTIN TIME PARTIAL: CPT

## 2024-08-27 PROCEDURE — 90715 TDAP VACCINE 7 YRS/> IM: CPT

## 2024-08-27 PROCEDURE — 2580000003 HC RX 258

## 2024-08-27 PROCEDURE — 96376 TX/PRO/DX INJ SAME DRUG ADON: CPT

## 2024-08-27 PROCEDURE — 6360000004 HC RX CONTRAST MEDICATION: Performed by: EMERGENCY MEDICINE

## 2024-08-27 PROCEDURE — 71260 CT THORAX DX C+: CPT

## 2024-08-27 PROCEDURE — 82565 ASSAY OF CREATININE: CPT

## 2024-08-27 PROCEDURE — 96374 THER/PROPH/DIAG INJ IV PUSH: CPT

## 2024-08-27 PROCEDURE — 86850 RBC ANTIBODY SCREEN: CPT

## 2024-08-27 PROCEDURE — 70450 CT HEAD/BRAIN W/O DYE: CPT

## 2024-08-27 PROCEDURE — 70486 CT MAXILLOFACIAL W/O DYE: CPT

## 2024-08-27 PROCEDURE — 96365 THER/PROPH/DIAG IV INF INIT: CPT

## 2024-08-27 PROCEDURE — 6360000002 HC RX W HCPCS

## 2024-08-27 PROCEDURE — 86901 BLOOD TYPING SEROLOGIC RH(D): CPT

## 2024-08-27 PROCEDURE — 71045 X-RAY EXAM CHEST 1 VIEW: CPT

## 2024-08-27 PROCEDURE — 93005 ELECTROCARDIOGRAM TRACING: CPT

## 2024-08-27 PROCEDURE — 74177 CT ABD & PELVIS W/CONTRAST: CPT

## 2024-08-27 PROCEDURE — 96375 TX/PRO/DX INJ NEW DRUG ADDON: CPT

## 2024-08-27 PROCEDURE — 85027 COMPLETE CBC AUTOMATED: CPT

## 2024-08-27 PROCEDURE — 76376 3D RENDER W/INTRP POSTPROCES: CPT

## 2024-08-27 PROCEDURE — 12011 RPR F/E/E/N/L/M 2.5 CM/<: CPT

## 2024-08-27 PROCEDURE — 86900 BLOOD TYPING SEROLOGIC ABO: CPT

## 2024-08-27 PROCEDURE — 72125 CT NECK SPINE W/O DYE: CPT

## 2024-08-27 PROCEDURE — 82947 ASSAY GLUCOSE BLOOD QUANT: CPT

## 2024-08-27 PROCEDURE — 90471 IMMUNIZATION ADMIN: CPT

## 2024-08-27 PROCEDURE — 84520 ASSAY OF UREA NITROGEN: CPT

## 2024-08-27 PROCEDURE — 82077 ASSAY SPEC XCP UR&BREATH IA: CPT

## 2024-08-27 PROCEDURE — 80051 ELECTROLYTE PANEL: CPT

## 2024-08-27 PROCEDURE — 36415 COLL VENOUS BLD VENIPUNCTURE: CPT

## 2024-08-27 PROCEDURE — 85610 PROTHROMBIN TIME: CPT

## 2024-08-27 PROCEDURE — 99285 EMERGENCY DEPT VISIT HI MDM: CPT

## 2024-08-27 PROCEDURE — 80076 HEPATIC FUNCTION PANEL: CPT

## 2024-08-27 RX ORDER — LIDOCAINE HYDROCHLORIDE AND EPINEPHRINE 10; 10 MG/ML; UG/ML
20 INJECTION, SOLUTION INFILTRATION; PERINEURAL ONCE
Status: DISCONTINUED | OUTPATIENT
Start: 2024-08-27 | End: 2024-08-27 | Stop reason: HOSPADM

## 2024-08-27 RX ORDER — HYDROCODONE BITARTRATE AND ACETAMINOPHEN 5; 325 MG/1; MG/1
1 TABLET ORAL EVERY 6 HOURS PRN
Qty: 12 TABLET | Refills: 0 | Status: SHIPPED | OUTPATIENT
Start: 2024-08-27 | End: 2024-08-30

## 2024-08-27 RX ORDER — IOPAMIDOL 755 MG/ML
80 INJECTION, SOLUTION INTRAVASCULAR
Status: COMPLETED | OUTPATIENT
Start: 2024-08-27 | End: 2024-08-27

## 2024-08-27 RX ORDER — FENTANYL CITRATE 50 UG/ML
50 INJECTION, SOLUTION INTRAMUSCULAR; INTRAVENOUS ONCE
Status: COMPLETED | OUTPATIENT
Start: 2024-08-27 | End: 2024-08-27

## 2024-08-27 RX ORDER — ONDANSETRON 2 MG/ML
4 INJECTION INTRAMUSCULAR; INTRAVENOUS ONCE
Status: COMPLETED | OUTPATIENT
Start: 2024-08-27 | End: 2024-08-27

## 2024-08-27 RX ADMIN — IOPAMIDOL 80 ML: 755 INJECTION, SOLUTION INTRAVENOUS at 19:45

## 2024-08-27 RX ADMIN — FENTANYL CITRATE 50 MCG: 50 INJECTION, SOLUTION INTRAMUSCULAR; INTRAVENOUS at 19:03

## 2024-08-27 RX ADMIN — FENTANYL CITRATE 50 MCG: 50 INJECTION, SOLUTION INTRAMUSCULAR; INTRAVENOUS at 20:19

## 2024-08-27 RX ADMIN — TETANUS TOXOID, REDUCED DIPHTHERIA TOXOID AND ACELLULAR PERTUSSIS VACCINE, ADSORBED 0.5 ML: 5; 2.5; 8; 8; 2.5 SUSPENSION INTRAMUSCULAR at 18:47

## 2024-08-27 RX ADMIN — SODIUM CHLORIDE 3000 MG: 900 INJECTION INTRAVENOUS at 20:11

## 2024-08-27 RX ADMIN — ONDANSETRON 4 MG: 2 INJECTION INTRAMUSCULAR; INTRAVENOUS at 19:04

## 2024-08-27 ASSESSMENT — PAIN - FUNCTIONAL ASSESSMENT
PAIN_FUNCTIONAL_ASSESSMENT: 0-10

## 2024-08-27 ASSESSMENT — PAIN SCALES - GENERAL
PAINLEVEL_OUTOF10: 8
PAINLEVEL_OUTOF10: 7
PAINLEVEL_OUTOF10: 5
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
## 2024-08-27 NOTE — ED NOTES
Pt resting on cot stating pain is 7/10. Pt given urinal. Pt states he will wait until family gets here to use it. Vitals reevaluated. Respirations even and unlabored. Call light within reach. Denies further needs.
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
## 2024-08-28 ENCOUNTER — TELEPHONE (OUTPATIENT)
Dept: ENT CLINIC | Age: 54
End: 2024-08-28

## 2024-08-28 LAB
EKG ATRIAL RATE: 90 BPM
EKG P AXIS: 75 DEGREES
EKG P-R INTERVAL: 168 MS
EKG Q-T INTERVAL: 376 MS
EKG QRS DURATION: 94 MS
EKG QTC CALCULATION (BAZETT): 459 MS
EKG R AXIS: 60 DEGREES
EKG T AXIS: 38 DEGREES
EKG VENTRICULAR RATE: 90 BPM

## 2024-08-28 PROCEDURE — 93010 ELECTROCARDIOGRAM REPORT: CPT | Performed by: NUCLEAR MEDICINE

## 2024-08-28 NOTE — DISCHARGE INSTRUCTIONS
Return to the emergency department immediately for any change or worsening of symptoms including but not limited to chest pain, shortness of breath, dizziness, weakness, numbness or tingling in extremities, worsening headache, change in vision.  Please follow-up with the spinal surgeon and with ENT.
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
## 2024-08-28 NOTE — ED NOTES
Pt resting on cot with family at bedside. Vitals reevaluated. Respirations even and unlabored. Warm blanket applied. Denies further needs.

## 2024-08-29 ENCOUNTER — OFFICE VISIT (OUTPATIENT)
Dept: NEUROSURGERY | Age: 54
End: 2024-08-29
Payer: MEDICARE

## 2024-08-29 VITALS
HEART RATE: 58 BPM | DIASTOLIC BLOOD PRESSURE: 95 MMHG | SYSTOLIC BLOOD PRESSURE: 141 MMHG | HEIGHT: 69 IN | BODY MASS INDEX: 23.7 KG/M2 | WEIGHT: 160 LBS

## 2024-08-29 DIAGNOSIS — M54.2 NECK PAIN: Primary | ICD-10-CM

## 2024-08-29 PROCEDURE — 99203 OFFICE O/P NEW LOW 30 MIN: CPT

## 2024-08-29 PROCEDURE — 3080F DIAST BP >= 90 MM HG: CPT

## 2024-08-29 PROCEDURE — 3077F SYST BP >= 140 MM HG: CPT

## 2024-08-29 RX ORDER — CYCLOBENZAPRINE HCL 5 MG
5 TABLET ORAL 3 TIMES DAILY PRN
Qty: 30 TABLET | Refills: 0 | Status: SHIPPED | OUTPATIENT
Start: 2024-08-29 | End: 2024-09-28
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
## 2024-09-02 ASSESSMENT — ENCOUNTER SYMPTOMS: BACK PAIN: 1

## 2024-09-03 ENCOUNTER — OFFICE VISIT (OUTPATIENT)
Dept: PHYSICAL MEDICINE AND REHAB | Age: 54
End: 2024-09-03
Payer: MEDICARE

## 2024-09-03 VITALS — SYSTOLIC BLOOD PRESSURE: 122 MMHG | DIASTOLIC BLOOD PRESSURE: 78 MMHG | BODY MASS INDEX: 23.61 KG/M2 | WEIGHT: 160 LBS

## 2024-09-03 DIAGNOSIS — M79.18 MYOFASCIAL PAIN: ICD-10-CM

## 2024-09-03 DIAGNOSIS — R52 ACUTE PAIN: ICD-10-CM

## 2024-09-03 DIAGNOSIS — T14.90XA TRAUMA: Primary | ICD-10-CM

## 2024-09-03 PROCEDURE — 99214 OFFICE O/P EST MOD 30 MIN: CPT | Performed by: NURSE PRACTITIONER

## 2024-09-03 PROCEDURE — 3078F DIAST BP <80 MM HG: CPT | Performed by: NURSE PRACTITIONER

## 2024-09-03 PROCEDURE — 3074F SYST BP LT 130 MM HG: CPT | Performed by: NURSE PRACTITIONER

## 2024-09-03 NOTE — PROGRESS NOTES
Functionality Assessment/Goals Worksheet     On a scale of 0 (Does not Interfere) to 10 (Completely Interferes)     1.  Which number describes how during the past week pain has interfered with           the following:  A.  General Activity:  8  B.  Mood: 7  C.  Walking Ability:  5  D.  Normal Work (Includes both work outside the home and housework):  9  E.  Relations with Other People:   6  F.  Sleep:   9  G.  Enjoyment of Life:   8    2.  Patient Prefers to Take their Pain Medications:     [x]  On a regular basis   []  Only when necessary    []  Does not take pain medications    3.  What are the Patient's Goals/Expectations for Visiting Pain Management?     [x]  Learn about my pain    []  Receive Medication   []  Physical Therapy     []  Treat Depression   []  Receive Injections    []  Treat Sleep   []  Deal with Anxiety and Stress   []  Treat Opoid Dependence/Addiction   []  Other:                                
performed by Osmar Castro DO at [facility] OR       Family History   Problem Relation Age of Onset    Heart Disease Mother     Cancer Mother     Heart Disease Father     Heart Murmur Sister     Heart Murmur Brother     Heart Disease Maternal Grandmother     Cancer Maternal Grandmother        Medications & Allergies:   Current Outpatient Medications   Medication Instructions    amLODIPine (NORVASC) 5 mg, Oral, DAILY    amoxicillin-clavulanate (AUGMENTIN) 875-125 MG per tablet 1 tablet, Oral, 2 TIMES DAILY    atorvastatin (LIPITOR) 20 mg, Oral, DAILY    cyclobenzaprine (FLEXERIL) 5 mg, Oral, 3 TIMES DAILY PRN    diclofenac (VOLTAREN) 50 mg, Oral, 2 TIMES DAILY    losartan (COZAAR) 25 mg, Oral, DAILY    tiZANidine (ZANAFLEX) 4 MG tablet take 1 tablet by mouth at bedtime if needed       No Known Allergies    Review of Systems:   Constitutional: negative for weight changes or fevers  Genitourinary: negative for bowel/bladder incontinence   Musculoskeletal: positive for neck pain, mid back pain, right leg pain, low back, right ischial bursitis  Neurological: positive for right arm weakness or numbness/tingling  Behavioral/Psych: negative for anxiety/depression   All other systems reviewed and are negative    Objective:     Vitals:    09/03/24 1154   BP: 122/78         Constitutional: Pleasant, no acute distress   Head: Normocephalic, atraumatic   Eyes: Conjunctivae normal   Neck: Supple, symmetrical   Respiration: Non-labored breathing   Cardiovascular: Limbs warm and well perfused   Musculoskeletal: Decreased cervical ROM in all planes. Positive Spurling maneuver bilaterally. Increased pain with facet loading. Tenderness to palpation in right side cervical paraspinals or other posterior neck musculature. And down into thoracic paraspinals bilaterally  Neuro: Alert, oriented. CN II-XII appear grossly intact. Motor strength 4/5 SAb, EF, EE, WE, Jacob right side, 5/5 left side. LT sensation intact in upper

## 2024-09-09 ENCOUNTER — PROCEDURE VISIT (OUTPATIENT)
Dept: UROLOGY | Age: 54
End: 2024-09-09

## 2024-09-09 VITALS — RESPIRATION RATE: 14 BRPM | HEIGHT: 69 IN | WEIGHT: 160 LBS | BODY MASS INDEX: 23.7 KG/M2

## 2024-09-09 DIAGNOSIS — R97.20 ELEVATED PSA: Primary | ICD-10-CM

## 2024-09-09 RX ORDER — TOBRAMYCIN 40 MG/ML
80 INJECTION INTRAMUSCULAR; INTRAVENOUS ONCE
Status: COMPLETED | OUTPATIENT
Start: 2024-09-09 | End: 2024-09-09

## 2024-09-09 RX ADMIN — TOBRAMYCIN 80 MG: 40 INJECTION INTRAMUSCULAR; INTRAVENOUS at 08:22

## 2024-09-23 ENCOUNTER — OFFICE VISIT (OUTPATIENT)
Dept: UROLOGY | Age: 54
End: 2024-09-23
Payer: MEDICARE

## 2024-09-23 ENCOUNTER — TELEPHONE (OUTPATIENT)
Dept: UROLOGY | Age: 54
End: 2024-09-23

## 2024-09-23 VITALS — WEIGHT: 160 LBS | BODY MASS INDEX: 23.7 KG/M2 | HEIGHT: 69 IN | RESPIRATION RATE: 15 BRPM

## 2024-09-23 DIAGNOSIS — C61 PROSTATE CANCER (HCC): Primary | ICD-10-CM

## 2024-09-23 PROCEDURE — 99214 OFFICE O/P EST MOD 30 MIN: CPT | Performed by: UROLOGY

## 2024-09-24 DIAGNOSIS — E78.2 MIXED HYPERLIPIDEMIA: ICD-10-CM

## 2024-09-24 DIAGNOSIS — M54.2 CERVICALGIA: ICD-10-CM

## 2024-09-24 DIAGNOSIS — S13.4XXD WHIPLASH INJURY TO NECK, SUBSEQUENT ENCOUNTER: ICD-10-CM

## 2024-09-24 DIAGNOSIS — M62.838 MUSCLE SPASM: ICD-10-CM

## 2024-09-24 DIAGNOSIS — I10 ESSENTIAL HYPERTENSION: ICD-10-CM

## 2024-09-25 ENCOUNTER — SOCIAL WORK (OUTPATIENT)
Dept: INFUSION THERAPY | Age: 54
End: 2024-09-25

## 2024-09-25 RX ORDER — LOSARTAN POTASSIUM 25 MG/1
25 TABLET ORAL DAILY
Qty: 90 TABLET | Refills: 4 | Status: SHIPPED | OUTPATIENT
Start: 2024-09-25

## 2024-09-25 RX ORDER — ATORVASTATIN CALCIUM 20 MG/1
20 TABLET, FILM COATED ORAL DAILY
Qty: 90 TABLET | Refills: 4 | Status: SHIPPED | OUTPATIENT
Start: 2024-09-25

## 2024-10-01 ENCOUNTER — OFFICE VISIT (OUTPATIENT)
Dept: PHYSICAL MEDICINE AND REHAB | Age: 54
End: 2024-10-01

## 2024-10-01 VITALS
DIASTOLIC BLOOD PRESSURE: 80 MMHG | HEIGHT: 69 IN | SYSTOLIC BLOOD PRESSURE: 118 MMHG | BODY MASS INDEX: 23.7 KG/M2 | WEIGHT: 160 LBS

## 2024-10-01 DIAGNOSIS — G89.4 CHRONIC PAIN SYNDROME: ICD-10-CM

## 2024-10-01 DIAGNOSIS — M47.814 THORACIC SPONDYLOSIS: ICD-10-CM

## 2024-10-01 DIAGNOSIS — M70.71 ISCHIAL BURSITIS OF RIGHT SIDE: ICD-10-CM

## 2024-10-01 DIAGNOSIS — M79.18 MYOFASCIAL PAIN: Primary | ICD-10-CM

## 2024-10-01 DIAGNOSIS — M48.10 DISH (DIFFUSE IDIOPATHIC SKELETAL HYPEROSTOSIS): ICD-10-CM

## 2024-10-01 DIAGNOSIS — M47.816 LUMBAR SPONDYLOSIS: ICD-10-CM

## 2024-10-01 RX ORDER — METHOCARBAMOL 100 MG/ML
100 INJECTION, SOLUTION INTRAMUSCULAR; INTRAVENOUS ONCE
Status: COMPLETED | OUTPATIENT
Start: 2024-10-01 | End: 2024-10-02

## 2024-10-01 RX ORDER — DICLOFENAC SODIUM 75 MG/1
75 TABLET, DELAYED RELEASE ORAL 2 TIMES DAILY
Qty: 60 TABLET | Refills: 3 | Status: SHIPPED | OUTPATIENT
Start: 2024-10-01

## 2024-10-01 NOTE — PROGRESS NOTES
Functionality Assessment/Goals Worksheet     On a scale of 0 (Does not Interfere) to 10 (Completely Interferes)     1.  Which number describes how during the past week pain has interfered with           the following:  A.  General Activity:  5  B.  Mood: 4  C.  Walking Ability:  3  D.  Normal Work (Includes both work outside the home and housework):  5  E.  Relations with Other People:   0  F.  Sleep:   7  G.  Enjoyment of Life:   4    2.  Patient Prefers to Take their Pain Medications:     []  On a regular basis   [x]  Only when necessary    []  Does not take pain medications    3.  What are the Patient's Goals/Expectations for Visiting Pain Management?     [x]  Learn about my pain    []  Receive Medication   []  Physical Therapy     []  Treat Depression   []  Receive Injections    []  Treat Sleep   []  Deal with Anxiety and Stress   []  Treat Opoid Dependence/Addiction   []  Other:                                
MEG Sagastume Sr. today.  I spent over 35 minutes evaluating this patient, reviewing previous notes and images and completing documentation.       BUSHRA Mazariegos CNP   Interventional Pain Management/PM&R   [facility]     Procedure  Visit Date: 10/1/24    Luis Sagastume Bill is a 54 y.o. male presents today in the office for the following:  Chief Complaint   Patient presents with    Follow-up     4 week FU        History of Present Illness   Luis is here today for trigger point injections.     Pre-Op Diagnosis   Myofacial pain syndrome     Post-Op Diagnosis   Myofacial pain syndrome     Procedure: Trigger point injection(s)    Procedure Documentation   Patient identified. Consent signed. Site identified.  Trigger points were identified in the bilateral trapezius, bilateral thoracic paraspinals for a total of 10 trigger point injections. Then with a 25g needle entered each trigger point and after negative aspiration, 1 cc of a mixture containing 1:10 - 100 mg methocarbamol: 0.25% bupivacaine was injected at each trigger point for a total of 10 trigger points.         Procedural Complications: None      Vitals:    10/01/24 1205   BP: 118/80   Site: Left Upper Arm   Position: Sitting   Weight: 72.6 kg (160 lb)   Height: 1.753 m (5' 9.02\")       Conclusion   No complications encountered.  Patient discharged stable condition.  Patient told if any problems to call office or go to ER.    Orders Placed This Encounter   Medications    diclofenac (VOLTAREN) 75 MG EC tablet     Sig: Take 1 tablet by mouth 2 times daily     Dispense:  60 tablet     Refill:  3    methocarbamol (ROBAXIN) injection 100 mg    Handicap Placard MISC     Sig: by Does not apply route EXP 10/1/2025     Dispense:  1 each     Refill:  0       Electronically signed by BUSHRA Mazariegos CNP on 10/1/24 at 12:33 PM EDT

## 2024-10-02 RX ADMIN — METHOCARBAMOL 100 MG: 100 INJECTION, SOLUTION INTRAMUSCULAR; INTRAVENOUS at 14:39

## 2024-10-14 ENCOUNTER — OFFICE VISIT (OUTPATIENT)
Dept: UROLOGY | Age: 54
End: 2024-10-14
Payer: MEDICARE

## 2024-10-14 VITALS — RESPIRATION RATE: 18 BRPM | WEIGHT: 160 LBS | BODY MASS INDEX: 23.7 KG/M2 | HEIGHT: 69 IN

## 2024-10-14 DIAGNOSIS — C61 PROSTATE CANCER (HCC): Primary | ICD-10-CM

## 2024-10-14 PROCEDURE — 99213 OFFICE O/P EST LOW 20 MIN: CPT | Performed by: UROLOGY

## 2024-10-14 NOTE — PROGRESS NOTES
Per patient medication list is the same as previous reviewed.   
conjunction with radiation therapy.  The side effects include hot flashes, fatigue, breast enlargement, diminished libido, ED and long term development of osteoporosis.  The patient was told he will encouraged to take supplemental Calcium and Vitamin D to prevent the latter.  3.  Radiation Therapy in the form of external beam radiation (IMRT) or prostate brachytherapy. Patient told that IMRT is given 5 days a week for 7-8 weeks and the side effects include rectal and bladder injury (OAB), erectile dysfunction (ED) and incontinence.  Long term the patient may experience hematuria and radiation cystitis.  Brachytherapy is done as an outpatient procedure under general anesthesia and postop the patient may experience dysuria, urgency, frequency, urge incontinence, increasing obstructive voiding symptoms and to a lesser degree than IMRT, rectal radiation injury and ED.  Patients may require concomitant hormonal therapy with IMRT depending on the grade and extent of cancer.  The patient may require hormonal therapy to downsize the prostate gland prior to brachytherapy.   4.  Open or Robotic assisted laparoscopic radical prostatectomy.  Patient told about the options of open vs. Robotic assisted laparoscopic prostatectomy with or without pelvic lymphadenectomy.  I discussed the risks including infection, bleeding, the risks of anesthesia, DVT, PE, MI, stroke, death, rectal injury and urethral stricture/bladder neck contracture.  I discussed the side effect of stress urinary incontinence which is temporary for most patients.  I discussed the side effect of ED and the fact that it may take 6-18 months to recover this function.

## 2024-11-01 ENCOUNTER — APPOINTMENT (OUTPATIENT)
Dept: GENERAL RADIOLOGY | Age: 54
End: 2024-11-01
Payer: MEDICARE

## 2024-11-01 ENCOUNTER — HOSPITAL ENCOUNTER (EMERGENCY)
Age: 54
Discharge: HOME | End: 2024-11-01
Payer: MEDICARE

## 2024-11-01 VITALS
WEIGHT: 165 LBS | HEART RATE: 102 BPM | TEMPERATURE: 97.8 F | RESPIRATION RATE: 18 BRPM | DIASTOLIC BLOOD PRESSURE: 100 MMHG | SYSTOLIC BLOOD PRESSURE: 163 MMHG | OXYGEN SATURATION: 98 % | BODY MASS INDEX: 24.37 KG/M2

## 2024-11-01 DIAGNOSIS — S70.02XA HEMATOMA OF LEFT HIP, INITIAL ENCOUNTER: Primary | ICD-10-CM

## 2024-11-01 PROCEDURE — 72170 X-RAY EXAM OF PELVIS: CPT

## 2024-11-01 PROCEDURE — 6370000000 HC RX 637 (ALT 250 FOR IP): Performed by: PHYSICIAN ASSISTANT

## 2024-11-01 PROCEDURE — 99283 EMERGENCY DEPT VISIT LOW MDM: CPT

## 2024-11-01 PROCEDURE — 73552 X-RAY EXAM OF FEMUR 2/>: CPT

## 2024-11-01 RX ORDER — CEPHALEXIN 500 MG/1
500 CAPSULE ORAL 4 TIMES DAILY
Qty: 40 CAPSULE | Refills: 0 | Status: SHIPPED | OUTPATIENT
Start: 2024-11-01 | End: 2024-11-11

## 2024-11-01 RX ADMIN — CEPHALEXIN 500 MG: 250 CAPSULE ORAL at 20:12

## 2024-11-01 ASSESSMENT — PAIN SCALES - GENERAL: PAINLEVEL_OUTOF10: 6

## 2024-11-01 ASSESSMENT — PAIN DESCRIPTION - ORIENTATION: ORIENTATION: LEFT

## 2024-11-01 ASSESSMENT — PAIN DESCRIPTION - LOCATION: LOCATION: HIP

## 2024-11-01 ASSESSMENT — PAIN DESCRIPTION - ONSET: ONSET: ON-GOING

## 2024-11-01 ASSESSMENT — PAIN DESCRIPTION - PAIN TYPE: TYPE: ACUTE PAIN

## 2024-11-01 ASSESSMENT — PAIN DESCRIPTION - FREQUENCY: FREQUENCY: CONTINUOUS

## 2024-11-01 ASSESSMENT — PAIN - FUNCTIONAL ASSESSMENT: PAIN_FUNCTIONAL_ASSESSMENT: 0-10
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
## 2024-11-02 NOTE — DISCHARGE INSTRUCTIONS
You likely have a deep hematoma to your left hip area.  This could be an early infection as well.  Take the antibiotics as directed.  Follow-up with either your regular physician or the [facility] for recheck on Monday at 8 AM.  They have a walk-in clinic you do not need an appointment.    You can apply heat for 10 to 15 minutes at a time 4 times per day.    You can take over-the-counter acetaminophen as directed on bottle for additional pain control.    Discharge warning    Please remember that examination and testing performed in the emergency department is not a comprehensive evaluation of all medical conditions and does not replace the need to follow up with your primary care provider.  In the emergency department, we are only able to evaluate your symptoms in the current condition, but symptoms may change or worsen.  Although you are felt safe to be discharged today, if your symptoms persist or change, you need to be re-evaluated by your regular/primary care doctor as soon as possible.  If you are unable to make appointment with your regular doctor, please come back to the ER to be re-evaluated.

## 2024-11-26 ENCOUNTER — OFFICE VISIT (OUTPATIENT)
Dept: PHYSICAL MEDICINE AND REHAB | Age: 54
End: 2024-11-26
Payer: MEDICARE

## 2024-11-26 VITALS
HEIGHT: 69 IN | WEIGHT: 165 LBS | SYSTOLIC BLOOD PRESSURE: 122 MMHG | DIASTOLIC BLOOD PRESSURE: 70 MMHG | BODY MASS INDEX: 24.44 KG/M2

## 2024-11-26 DIAGNOSIS — M47.816 LUMBAR SPONDYLOSIS: ICD-10-CM

## 2024-11-26 DIAGNOSIS — R52 ACUTE PAIN: ICD-10-CM

## 2024-11-26 DIAGNOSIS — M47.814 THORACIC SPONDYLOSIS: ICD-10-CM

## 2024-11-26 DIAGNOSIS — M48.10 DISH (DIFFUSE IDIOPATHIC SKELETAL HYPEROSTOSIS): ICD-10-CM

## 2024-11-26 DIAGNOSIS — S70.12XD CONTUSION OF LEFT THIGH, SUBSEQUENT ENCOUNTER: ICD-10-CM

## 2024-11-26 DIAGNOSIS — M70.71 ISCHIAL BURSITIS OF RIGHT SIDE: ICD-10-CM

## 2024-11-26 DIAGNOSIS — G89.4 CHRONIC PAIN SYNDROME: ICD-10-CM

## 2024-11-26 DIAGNOSIS — M79.18 MYOFASCIAL PAIN: Primary | ICD-10-CM

## 2024-11-26 PROCEDURE — 3074F SYST BP LT 130 MM HG: CPT | Performed by: NURSE PRACTITIONER

## 2024-11-26 PROCEDURE — 99214 OFFICE O/P EST MOD 30 MIN: CPT | Performed by: NURSE PRACTITIONER

## 2024-11-26 PROCEDURE — 3078F DIAST BP <80 MM HG: CPT | Performed by: NURSE PRACTITIONER

## 2024-11-26 PROCEDURE — 20553 NJX 1/MLT TRIGGER POINTS 3/>: CPT | Performed by: NURSE PRACTITIONER

## 2024-11-26 RX ORDER — METHOCARBAMOL 100 MG/ML
100 INJECTION, SOLUTION INTRAMUSCULAR; INTRAVENOUS ONCE
Status: COMPLETED | OUTPATIENT
Start: 2024-11-26 | End: 2024-11-26

## 2024-11-26 RX ADMIN — METHOCARBAMOL 100 MG: 100 INJECTION, SOLUTION INTRAMUSCULAR; INTRAVENOUS at 15:23

## 2024-11-26 NOTE — PROGRESS NOTES
[facility] PROFESSIONAL SERVS  [facility] NEUROSCIENCE AND REHABILITATION [address]  [facility] [address]  Dept: [phone]  Dept Fax: [phone]  Loc: [phone]    Visit Date: 11/26/2024    Functionality Assessment/Goals Worksheet     On a scale of 0 (Does not Interfere) to 10 (Completely Interferes)     1.  Which number describes how during the past week pain has interfered with       the following:  A.  General Activity:  4  B.  Mood: 3  C.  Walking Ability:  5  D.  Normal Work (Includes both work outside the home and housework):  5  E.  Relations with Other People:   5  F.  Sleep:   3  G.  Enjoyment of Life:   6    2.  Patient Prefers to Take their Pain Medications:     [x]  On a regular basis   []  Only when necessary    []  Does not take pain medications    3.  What are the Patient's Goals/Expectations for Visiting Pain Management?     [x]  Learn about my pain    []  Receive Medication   []  Physical Therapy     []  Treat Depression   []  Receive Injections    []  Treat Sleep   []  Deal with Anxiety and Stress   []  Treat Opoid Dependence/Addiction   []  Other:  
prescribed by PCP, UBSHRA Melgar. Advised to not take medication on empty stomach.    We recommend that the patient follow the recommendation of the prescribing provider with regard to the above medication(s) and contact their prescribing provider for refills if needed.    Adjuvants:   Patient is to stop naproxen, and start diclofenac 75 mg twice daily.  Educated to take as ordered, no additional NSAIDs, and to take with food.    OARRS reviewed, pain contract agreement signed    Interventions:   None    Procedure educations:  Discussed with patient about risks with procedure including infection, reaction to medication, increased pain, failure of procedures, worsening of symptoms, or bleeding.    Anticoagulation/NPO Recommendations:   Patient does not need to hold any medications prior to the procedure.   Patient will need to be NPO x 8 hours prior to the procedure.  We will start an IV prior to the procedure.    We will utilize moderate conscious intravenous sedation due to patient's extreme needle phobia and procedural anxiety. Without IV anesthesia patient is at increased risk of procedural and post-procedural complications including tissue damage, severe pain, and potential nerve damage due to inability to lay still. These will likely increase the possibility of patient having a worse outcome after the procedure and/or a failed response to injection therapy. Patient's medical history has been reviewed in detail and they are an ideal candidate for moderate conscious IV sedation. The patient understands the risks associated with sedation. Patient is willing to proceed with the injection under conscious IV sedation.     Multidisciplinary Pain Management:   In the presence of complex, chronic, and multi-factorial pain, the importance of a multidisciplinary approach to pain management in the patient’s management regimen was emphasized and discussed in great detail. The patient was also advised to continue

## 2025-02-19 NOTE — PROGRESS NOTES
[address] [facility]  PHYSICAL THERAPY  [] EVALUATION  [] DAILY NOTE (LAND) [x] DAILY NOTE (AQUATIC ) [] PROGRESS NOTE [] DISCHARGE NOTE    [x] OUTPATIENT REHABILITATION CENTER [facility]   [] [place] [address]    [] [facility]   [] [facility] Pearl    Date: 2022  Patient Name:  Casey Kanner  : 1970  MRN: [id number]  CSN: [id number]    Referring Practitioner Anna Calderón MD   Diagnosis Other intervertebral disc degeneration, lumbosacral region [M51.37]  Spondylosis without myelopathy or radiculopathy, lumbosacral region [M47.817]  Sciatica, right side [M54.31]    Treatment Diagnosis Chronic neck and back pain, decreased cervical, thoracic, lumbar and hip ROM, core weakness   Date of Evaluation 22    Additional Pertinent History HTN, stroke. Functional Outcome Measure Used Modified Oswestry   Functional Outcome Score 26/50=52% (22)       Insurance: Primary: Payor: [employer] /  /  / ,   Secondary:    Authorization Information: INSURANCE THERAPY BENEFIT: No precert until after 22MU visit. Visit Limit Based on Precert  AQUATIC THERAPY COVERED:   Yes  MODALITIES COVERED:  Yes except for Iontophoresis and Hot/Cold Packs. TELEHEALTH COVERED: No   Visit # 2, 2/10 for progress note   Visits Allowed: 30   Recertification Date: 28   Physician Follow-Up: Upon completion of PT; Epidural 10/15/22   Physician Orders:    History of Present Illness: Pt presents with chronic lumbar pain that has been progressively worsening for past 3 years. Pt began having trouble doing his job duties. Pain affects sleep. Pain affects position tolerance. Lifting, return to stand from bending over, standing with prolonged sitting cause pain. Right leg pain throbbing occurs during the night. Pt has pain in neck/upper back as well. XR/MRI showed disc slipping which may be causing pinched nerve in back.  Pt taking prescribed Lyrica and Naproxen for pain control. Pt notes heat helps. SUBJECTIVE: Pt c/o low back pain. States he had to walk to therapy today so pain is high rating 5-6/10  AQUATICS TREATMENT   Precautions:    Pain:     X in shaded column indicates activity completed today   Exercise/Intervention Sets/Sec  Notes   Walk Forward 2lap  X    Walk Backward 2lap  X    Walk Sideways 2lap  X           Lower Extremity Exercises:       Heel/Toe Raises 10x1  x    Marches 10x1  x    Squats 10x1  x    3 Way Hip 10x1  x    Hamstring Curls 10x1  x    Lunges       Step-Ups              Lower Extremity Stretches:              Seated Exercises:              Upper Extremity Exercises:       Shoulder Flexion       Shoulder ABD/ADD       Shoulder Horizontal ABD/ADD       Shoulder IR/ER       Shoulder Circles       Shoulder Shrugs       Rows       Bicep Curls              Upper Extremity Stretches:              Balance:              Dynamic Gait:              Deep Water:       Hang 5mins  x    Bicycle       Hip ABD/ADD 10x  x    Hip Flex/Ext 10x  x         Specific Interventions Next Treatment: alternate aquatics and land for neck and back ROM, strengthening, pain management, manual and modalities as needed    Activity/Treatment Tolerance:  [x]  Patient tolerated treatment well  []  Patient limited by fatigue  []  Patient limited by pain   []  Patient limited by medical complications  []  Other:     Assessment: Pt was seen for first aquatic treatment this date and tolerated treatment. Sadie Siu He rates pain as 5-6/10 upon arrival and3/10 with ex in pool. He notes increased \"pulling\" and pain in L LB region with ex but states it is \"tolerable\"  Pt reports increased heaviness and pain immediately exiting pool. Continue with plan of care as stated. Pt instructed to monitor symptoms after treatment and inform  reaction at next visit.       Goals    Patient Goal:Be painless, do daily activity without pain    Short Term Goals: 4 weeks  Patient will improve AROM of cervical rotation English

## (undated) DEVICE — HYPODERMIC SAFETY NEEDLE: Brand: MAGELLAN

## (undated) DEVICE — TOWEL,OR,DSP,ST,BLUE,STD,4/PK,20PK/CS: Brand: MEDLINE

## (undated) DEVICE — MARKER,SKIN,WI/RULER AND LABELS: Brand: MEDLINE

## (undated) DEVICE — GLOVE ORANGE PI 7 1/2   MSG9075

## (undated) DEVICE — GAUZE SPONGES,USP TYPE VII GAUZE, 12 PLY: Brand: CURITY

## (undated) DEVICE — NEEDLE HYPO 18GA L1.5IN THN WALL PIVOTING SHLD BVL ORIENTED

## (undated) DEVICE — 3 ML SYRINGE LUER-LOCK TIP: Brand: MONOJECT

## (undated) DEVICE — SYRINGE MEDICAL 3ML CLEAR PLASTIC STANDARD NON CONTROL LUERLOCK TIP DISPOSABLE

## (undated) DEVICE — SYRINGE MED 10ML LUERLOCK TIP W/O SFTY DISP

## (undated) DEVICE — CHLORAPREP 26ML CLEAR

## (undated) DEVICE — 1840 FOAM BLOCK NEEDLE COUNTER: Brand: DEVON

## (undated) DEVICE — BANDAGE ADH W1XL3IN NAT FAB WVN FLX DURABLE N ADH PD SEAL

## (undated) DEVICE — 6 ML SYRINGE LUER-LOCK TIP: Brand: MONOJECT

## (undated) DEVICE — GLOVE BIOGEL POWDER FREE SZ 8

## (undated) DEVICE — NEEDLE SPNL 22GA L3.5IN BLK HUB S STL REG WALL FIT STYL

## (undated) DEVICE — SHEET,DRAPE,3/4,53X77,STERILE: Brand: MEDLINE

## (undated) DEVICE — TRAY NRV BLK PARACERVICAL PUDEN W/ 10ML CTRL SYR